# Patient Record
Sex: FEMALE | Race: WHITE | NOT HISPANIC OR LATINO | Employment: FULL TIME | URBAN - METROPOLITAN AREA
[De-identification: names, ages, dates, MRNs, and addresses within clinical notes are randomized per-mention and may not be internally consistent; named-entity substitution may affect disease eponyms.]

---

## 2017-01-10 ENCOUNTER — CONVERSION ENCOUNTER (OUTPATIENT)
Dept: FAMILY MEDICINE CLINIC | Facility: CLINIC | Age: 52
End: 2017-01-10

## 2017-01-11 ENCOUNTER — CONVERSION ENCOUNTER (OUTPATIENT)
Dept: FAMILY MEDICINE CLINIC | Facility: CLINIC | Age: 52
End: 2017-01-11

## 2017-01-11 LAB
ALBUMIN SERPL-MCNC: 4.9 G/DL (ref 3.6–5.1)
ALBUMIN/GLOB SERPL: ABNORMAL {RATIO} (ref 1–2.5)
ALP SERPL-CCNC: 75 UNITS/L (ref 33–130)
ALT SERPL-CCNC: 28 UNITS/L (ref 6–29)
ANA SER QL IA: NEGATIVE
AST SERPL-CCNC: 24 UNITS/L (ref 10–35)
BILIRUB SERPL-MCNC: 0.8 MG/DL (ref 0.2–1.2)
BUN SERPL-MCNC: 10 MG/DL (ref 7–25)
BUN/CREAT SERPL: ABNORMAL (ref 6–22)
CALCIUM SERPL-MCNC: 10.2 MG/DL (ref 8.6–10.4)
CHLORIDE SERPL-SCNC: 103 MMOL/L (ref 98–110)
CHOLEST SERPL-MCNC: 241 MG/DL (ref 125–200)
CHOLEST/HDLC SERPL: ABNORMAL {RATIO}
CO2 CONTENT VENOUS: 29 MMOL/L (ref 20–31)
CONV RF TITER: 7
CONV TOTAL PROTEIN: 7.6 G/DL (ref 6.1–8.1)
CREAT UR-MCNC: 0.73 MG/DL (ref 0.5–1.05)
ERYTHROCYTE [SEDIMENTATION RATE] IN BLOOD BY WESTERGREN METHOD: 11 MM/HR
GLOBULIN UR ELPH-MCNC: ABNORMAL G/DL (ref 1.9–3.7)
GLUCOSE SERPL-MCNC: 85 MG/DL (ref 65–99)
HDLC SERPL-MCNC: 79 MG/DL
LDLC SERPL CALC-MCNC: ABNORMAL MG/DL
POTASSIUM SERPL-SCNC: 4.2 MMOL/L (ref 3.5–5.3)
SODIUM SERPL-SCNC: 141 MMOL/L (ref 135–146)
TRIGL SERPL-MCNC: 65 MG/DL

## 2017-03-29 ENCOUNTER — HOSPITAL ENCOUNTER (OUTPATIENT)
Dept: MAMMOGRAPHY | Facility: HOSPITAL | Age: 52
Discharge: HOME | End: 2017-03-29
Attending: INTERNAL MEDICINE | Admitting: INTERNAL MEDICINE

## 2017-06-30 ENCOUNTER — CONVERSION ENCOUNTER (OUTPATIENT)
Dept: FAMILY MEDICINE CLINIC | Facility: CLINIC | Age: 52
End: 2017-06-30

## 2018-05-16 ENCOUNTER — HOSPITAL ENCOUNTER (OUTPATIENT)
Dept: MAMMOGRAPHY | Facility: HOSPITAL | Age: 53
Discharge: HOME | End: 2018-05-16
Attending: INTERNAL MEDICINE | Admitting: INTERNAL MEDICINE

## 2018-05-18 ENCOUNTER — CONVERSION ENCOUNTER (OUTPATIENT)
Dept: FAMILY MEDICINE CLINIC | Facility: CLINIC | Age: 53
End: 2018-05-18

## 2018-05-18 ENCOUNTER — HOSPITAL ENCOUNTER (OUTPATIENT)
Dept: FAMILY MEDICINE CLINIC | Facility: CLINIC | Age: 53
Setting detail: SPECIMEN
Discharge: HOME | End: 2018-05-18
Attending: INTERNAL MEDICINE | Admitting: INTERNAL MEDICINE

## 2018-05-18 LAB
ALBUMIN SERPL-MCNC: 4.5 G/DL (ref 3.5–4.8)
ALBUMIN/GLOB SERPL: 1.6 {RATIO} (ref 1–1.7)
ALP SERPL-CCNC: 84 IU/L (ref 32–91)
ALT SERPL-CCNC: 33 IU/L (ref 14–54)
ANION GAP SERPL CALC-SCNC: 10.8 MMOL/L (ref 10–20)
AST SERPL-CCNC: 31 IU/L (ref 15–41)
BASOPHILS # BLD AUTO: 0.1 10*3/UL (ref 0–0.2)
BASOPHILS NFR BLD AUTO: 1 % (ref 0–2)
BILIRUB SERPL-MCNC: 1 MG/DL (ref 0.3–1.2)
BUN SERPL-MCNC: 12 MG/DL (ref 8–20)
BUN/CREAT SERPL: 17.1 (ref 5.4–26.2)
CALCIUM SERPL-MCNC: 10.3 MG/DL (ref 8.9–10.3)
CHLORIDE SERPL-SCNC: 106 MMOL/L (ref 101–111)
CHOLEST SERPL-MCNC: 252 MG/DL
CHOLEST/HDLC SERPL: 3.5 {RATIO}
CONV CO2: 28 MMOL/L (ref 22–32)
CONV LDL CHOLESTEROL DIRECT: 161 MG/DL (ref 0–100)
CONV TOTAL PROTEIN: 7.3 G/DL (ref 6.1–7.9)
CREAT UR-MCNC: 0.7 MG/DL (ref 0.4–1)
DIFFERENTIAL METHOD BLD: (no result)
EOSINOPHIL # BLD AUTO: 0.2 10*3/UL (ref 0–0.3)
EOSINOPHIL # BLD AUTO: 3 % (ref 0–3)
ERYTHROCYTE [DISTWIDTH] IN BLOOD BY AUTOMATED COUNT: 13.1 % (ref 11.5–14.5)
GLOBULIN UR ELPH-MCNC: 2.8 G/DL (ref 2.5–3.8)
GLUCOSE SERPL-MCNC: 86 MG/DL (ref 65–99)
HCT VFR BLD AUTO: 43.5 % (ref 35–49)
HDLC SERPL-MCNC: 73 MG/DL
HGB BLD-MCNC: 14.5 G/DL (ref 12–15)
LDLC/HDLC SERPL: 2.2 {RATIO}
LIPID INTERPRETATION: ABNORMAL
LYMPHOCYTES # BLD AUTO: 2.2 10*3/UL (ref 0.8–4.8)
LYMPHOCYTES NFR BLD AUTO: 37 % (ref 18–42)
MCH RBC QN AUTO: 29.5 PG (ref 26–32)
MCHC RBC AUTO-ENTMCNC: 33.3 G/DL (ref 32–36)
MCV RBC AUTO: 88.6 FL (ref 80–94)
MONOCYTES # BLD AUTO: 0.5 10*3/UL (ref 0.1–1.3)
MONOCYTES NFR BLD AUTO: 9 % (ref 2–11)
NEUTROPHILS # BLD AUTO: 2.9 10*3/UL (ref 2.3–8.6)
NEUTROPHILS NFR BLD AUTO: 50 % (ref 50–75)
NRBC BLD AUTO-RTO: 0 /100{WBCS}
NRBC/RBC NFR BLD MANUAL: 0 10*3/UL
PLATELET # BLD AUTO: 302 10*3/UL (ref 150–450)
PMV BLD AUTO: 9.5 FL (ref 7.4–10.4)
POTASSIUM SERPL-SCNC: 3.8 MMOL/L (ref 3.6–5.1)
RBC # BLD AUTO: 4.9 10*6/UL (ref 4–5.4)
SODIUM SERPL-SCNC: 141 MMOL/L (ref 136–144)
TRIGL SERPL-MCNC: 91 MG/DL
VLDLC SERPL CALC-MCNC: 17.8 MG/DL
WBC # BLD AUTO: 5.8 10*3/UL (ref 4.5–11.5)

## 2018-05-30 ENCOUNTER — HOSPITAL ENCOUNTER (OUTPATIENT)
Dept: ULTRASOUND IMAGING | Facility: HOSPITAL | Age: 53
Discharge: HOME | End: 2018-05-30
Attending: INTERNAL MEDICINE | Admitting: INTERNAL MEDICINE

## 2019-05-30 ENCOUNTER — HOSPITAL ENCOUNTER (OUTPATIENT)
Dept: GENERAL RADIOLOGY | Facility: HOSPITAL | Age: 54
Discharge: HOME | End: 2019-05-30
Attending: INTERNAL MEDICINE | Admitting: INTERNAL MEDICINE

## 2019-06-04 VITALS
OXYGEN SATURATION: 98 % | RESPIRATION RATE: 16 BRPM | DIASTOLIC BLOOD PRESSURE: 80 MMHG | DIASTOLIC BLOOD PRESSURE: 85 MMHG | SYSTOLIC BLOOD PRESSURE: 106 MMHG | BODY MASS INDEX: 30.96 KG/M2 | WEIGHT: 209 LBS | HEART RATE: 76 BPM | OXYGEN SATURATION: 97 % | WEIGHT: 204 LBS | WEIGHT: 195 LBS | SYSTOLIC BLOOD PRESSURE: 124 MMHG | DIASTOLIC BLOOD PRESSURE: 72 MMHG | RESPIRATION RATE: 16 BRPM | SYSTOLIC BLOOD PRESSURE: 116 MMHG | HEIGHT: 69 IN | OXYGEN SATURATION: 98 % | HEART RATE: 91 BPM | HEART RATE: 80 BPM | RESPIRATION RATE: 12 BRPM

## 2020-01-17 ENCOUNTER — OFFICE VISIT (OUTPATIENT)
Dept: FAMILY MEDICINE CLINIC | Facility: CLINIC | Age: 55
End: 2020-01-17

## 2020-01-17 VITALS
BODY MASS INDEX: 31.6 KG/M2 | DIASTOLIC BLOOD PRESSURE: 70 MMHG | HEART RATE: 64 BPM | TEMPERATURE: 98.4 F | RESPIRATION RATE: 8 BRPM | SYSTOLIC BLOOD PRESSURE: 110 MMHG | WEIGHT: 214 LBS

## 2020-01-17 DIAGNOSIS — B07.9 VERRUCA VULGARIS: Primary | ICD-10-CM

## 2020-01-17 PROCEDURE — 17000 DESTRUCT PREMALG LESION: CPT | Performed by: INTERNAL MEDICINE

## 2020-01-17 PROCEDURE — 99213 OFFICE O/P EST LOW 20 MIN: CPT | Performed by: INTERNAL MEDICINE

## 2020-01-17 RX ORDER — CETIRIZINE HYDROCHLORIDE 10 MG/1
10 TABLET ORAL DAILY
COMMUNITY

## 2020-01-17 RX ORDER — CALCIUM CARBONATE 200(500)MG
1 TABLET,CHEWABLE ORAL
COMMUNITY
End: 2020-11-19

## 2020-01-17 RX ORDER — NAPROXEN SODIUM 220 MG
440 TABLET ORAL DAILY
COMMUNITY
Start: 2017-01-10

## 2020-01-17 NOTE — PROGRESS NOTES
Rooming Tab(CC,VS,Pt Hx,Fall Screen)  Chief Complaint   Patient presents with   • lesions on face       Subjective   Pt here for lesion on face that is growing- gets red and bleeding at times. Was flat and now raised and flaky.   has other new lesions that concern her as well  I have reviewed and updated her medications, medical history and problem list during today's office visit.     Patient Care Team:  Carly Worley MD as PCP - General  Carly Worley MD as PCP - Family Medicine    Problem List Tab  Medications Tab  Synopsis Tab  Chart Review Tab  Care Everywhere Tab  Immunizations Tab  Patient History Tab    Social History     Tobacco Use   • Smoking status: Never Smoker   • Smokeless tobacco: Never Used   Substance Use Topics   • Alcohol use: Yes     Frequency: Monthly or less     Comment: NO       Review of Systems   Constitutional: Negative for fatigue.   Cardiovascular: Negative for chest pain.   Skin: Positive for skin lesions.       Objective     Rooming Tab(CC,VS,Pt Hx,Fall Screen)  /70 (BP Location: Left arm, Patient Position: Sitting, Cuff Size: Adult)   Pulse 64   Temp 98.4 °F (36.9 °C) (Oral)   Resp 8   Wt 97.1 kg (214 lb)   BMI 31.60 kg/m²     Body mass index is 31.6 kg/m².    Physical Exam   Constitutional: She is oriented to person, place, and time. She appears well-developed and well-nourished.   HENT:   Head: Normocephalic and atraumatic.   Right Ear: Tympanic membrane normal.   Left Ear: Tympanic membrane normal.   Eyes: Pupils are equal, round, and reactive to light.   Neck: Normal range of motion. Neck supple.   Cardiovascular: Normal rate and regular rhythm.   No murmur heard.  Pulmonary/Chest: Effort normal and breath sounds normal.   Abdominal: Soft. Bowel sounds are normal. She exhibits no distension.   Neurological: She is oriented to person, place, and time.   Skin: Capillary refill takes less than 2 seconds.   Right cheek with raised verruca-  irritated    Nursing note and vitals reviewed.       Statin Choice Calculator  Data Reviewed:      Destruction of Lesion  Date/Time: 1/17/2020 8:29 AM  Performed by: Carly Worley MD  Authorized by: Carly Worley MD   Preparation: Patient was prepped and draped in the usual sterile fashion.  Local anesthesia used: no    Anesthesia:  Local anesthesia used: no    Sedation:  Patient sedated: no    Patient tolerance: Patient tolerated the procedure well with no immediate complications  Comments: Right cheek next to nares- verruca freeze for 2 minutes                    Assessment/Plan   Order Review Tab  Health Maintenance Tab  Patient Plan/Order Tab  Diagnoses and all orders for this visit:    1. Verruca vulgaris (Primary)  Comments:  tolerated freeze well. will call if does not resolve  Orders:  -     Destruction of Lesion        Wrapup Tab  Return if symptoms worsen or fail to improve, for Annual physical.      updated health maintenance today  They were informed of the diagnosis and treatment plan and directed to f/u for any further problems or concerns.

## 2020-06-08 ENCOUNTER — PATIENT MESSAGE (OUTPATIENT)
Dept: FAMILY MEDICINE CLINIC | Facility: CLINIC | Age: 55
End: 2020-06-08

## 2020-06-08 RX ORDER — METRONIDAZOLE 10 MG/G
GEL TOPICAL DAILY
Qty: 180 G | Refills: 0 | Status: SHIPPED | OUTPATIENT
Start: 2020-06-08

## 2020-06-08 NOTE — TELEPHONE ENCOUNTER
From: Kaylee Little  To: Carly Worley MD  Sent: 6/8/2020 7:23 AM EDT  Subject: Prescription Question    Dr. Worley prescribed Metrogel quite some time ago for my Rosacea. I am out of the metrogel but can't find the information to request a refill. Can she prescribe it again? And if so, can it be done through [facility] mail order?

## 2020-09-30 ENCOUNTER — PATIENT MESSAGE (OUTPATIENT)
Dept: FAMILY MEDICINE CLINIC | Facility: CLINIC | Age: 55
End: 2020-09-30

## 2020-09-30 DIAGNOSIS — Z12.31 ENCOUNTER FOR SCREENING MAMMOGRAM FOR MALIGNANT NEOPLASM OF BREAST: Primary | ICD-10-CM

## 2020-10-01 NOTE — TELEPHONE ENCOUNTER
From: Kaylee Little  To: Carly Worley MD  Sent: 9/30/2020 10:24 PM EDT  Subject: Referral Request    I have received notification that my mammogram is overdue. Could you please send the order to [facility] so that I may schedule an appointment? Thank you.

## 2020-10-22 ENCOUNTER — APPOINTMENT (OUTPATIENT)
Dept: GENERAL RADIOLOGY | Facility: HOSPITAL | Age: 55
End: 2020-10-22

## 2020-10-22 ENCOUNTER — HOSPITAL ENCOUNTER (OUTPATIENT)
Dept: MAMMOGRAPHY | Facility: HOSPITAL | Age: 55
Discharge: HOME | End: 2020-10-22
Admitting: INTERNAL MEDICINE

## 2020-10-22 ENCOUNTER — HOSPITAL ENCOUNTER (EMERGENCY)
Facility: HOSPITAL | Age: 55
Discharge: HOME | End: 2020-10-22
Admitting: EMERGENCY MEDICINE

## 2020-10-22 VITALS
DIASTOLIC BLOOD PRESSURE: 86 MMHG | SYSTOLIC BLOOD PRESSURE: 122 MMHG | TEMPERATURE: 98.9 F | BODY MASS INDEX: 34.1 KG/M2 | HEART RATE: 75 BPM | RESPIRATION RATE: 17 BRPM | WEIGHT: 225 LBS | HEIGHT: 68 IN | OXYGEN SATURATION: 99 %

## 2020-10-22 DIAGNOSIS — S86.912A KNEE STRAIN, LEFT, INITIAL ENCOUNTER: ICD-10-CM
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
## 2020-10-22 DIAGNOSIS — S92.351A DISPLACED FRACTURE OF FIFTH METATARSAL BONE, RIGHT FOOT, INITIAL ENCOUNTER FOR CLOSED FRACTURE: Primary | ICD-10-CM

## 2020-10-22 DIAGNOSIS — Z12.31 ENCOUNTER FOR SCREENING MAMMOGRAM FOR MALIGNANT NEOPLASM OF BREAST: ICD-10-CM

## 2020-10-22 PROCEDURE — 77063 BREAST TOMOSYNTHESIS BI: CPT

## 2020-10-22 PROCEDURE — 73562 X-RAY EXAM OF KNEE 3: CPT

## 2020-10-22 PROCEDURE — 73630 X-RAY EXAM OF FOOT: CPT

## 2020-10-22 PROCEDURE — 73610 X-RAY EXAM OF ANKLE: CPT

## 2020-10-22 PROCEDURE — 77067 SCR MAMMO BI INCL CAD: CPT

## 2020-10-22 PROCEDURE — 99284 EMERGENCY DEPT VISIT MOD MDM: CPT

## 2020-10-22 RX ORDER — HYDROCODONE BITARTRATE AND ACETAMINOPHEN 5; 325 MG/1; MG/1
1 TABLET ORAL EVERY 6 HOURS PRN
Qty: 12 TABLET | Refills: 0 | Status: SHIPPED | OUTPATIENT
Start: 2020-10-22 | End: 2020-11-19

## 2020-10-22 RX ORDER — ACETAMINOPHEN 500 MG
1000 TABLET ORAL ONCE
Status: COMPLETED | OUTPATIENT
Start: 2020-10-22 | End: 2020-10-22

## 2020-10-22 RX ORDER — HYDROCODONE BITARTRATE AND ACETAMINOPHEN 7.5; 325 MG/1; MG/1
1 TABLET ORAL ONCE
Status: COMPLETED | OUTPATIENT
Start: 2020-10-22 | End: 2020-10-22

## 2020-10-22 RX ADMIN — HYDROCODONE BITARTRATE AND ACETAMINOPHEN 1 TABLET: 7.5; 325 TABLET ORAL at 21:45

## 2020-10-22 RX ADMIN — ACETAMINOPHEN 1000 MG: 500 TABLET, FILM COATED ORAL at 19:44

## 2020-10-22 NOTE — ED NOTES
"Pt had a fall today. Pain and swelling to the left knee and right foot. Pt states she tripped on a step and felt her right foot \"pop\"      Netta Kendall RN  10/22/20 1934    "

## 2020-10-22 NOTE — ED PROVIDER NOTES
Subjective   55-year-old  female arrives to the emergency room via EMS [place] about 45 minutes ago.  Patient states that she was attempting to help her great nephew up onto a step when she misstepped and rolled her right ankle.  She states she fell to the ground and landed on her left knee.  Patient states she did not hit her head or lose consciousness and was able to stand up after the fall.  Onset: Prior to arrival  Location: Right ankle and foot and left knee  Duration: 45 minutes  Character: Aching with standing and movement  Aggravating/Alleviating Factors: Standing and moving/rest and immobilization  Radiation: Radiating up into the right ankle  Severity: Moderate            Review of Systems   Constitutional: Positive for activity change.   Musculoskeletal: Positive for arthralgias, gait problem and joint swelling.   All other systems reviewed and are negative.      Past Medical History:   Diagnosis Date   • AK (actinic keratosis)    • Ankle sprain    • Arthralgia    • Conjunctivitis    • Contact dermatitis, allergic    • Corneal abrasion    • Dry mouth    • Fatigue    • Rosacea    • Seborrheic keratosis        No Known Allergies    No past surgical history on file.    Family History   Problem Relation Age of Onset   • Cancer Father    • Lymphoma Sister         NON HODGKINS   • Rheum arthritis Mother    • Lung cancer Other         MEN ON DAD'S SIDE   • Other Other         BRAIN TUMOR;MEN ON DAD'S SIDE       Social History     Socioeconomic History   • Marital status:      Spouse name: Not on file   • Number of children: Not on file   • Years of education: Not on file   • Highest education level: Not on file   Tobacco Use   • Smoking status: Never Smoker   • Smokeless tobacco: Never Used   Substance and Sexual Activity   • Alcohol use: Yes     Frequency: Monthly or less     Comment: NO   • Drug use: Not Currently     Comment: NO           Objective   Physical  Exam  Constitutional:       General: She is not in acute distress.     Appearance: Normal appearance. She is not ill-appearing, toxic-appearing or diaphoretic.   Musculoskeletal:         General: Swelling and tenderness present.      Left knee: She exhibits bony tenderness. She exhibits normal range of motion, no swelling, no effusion, no ecchymosis, no deformity, no laceration, no erythema and normal alignment. Tenderness found.      Right ankle: She exhibits decreased range of motion and swelling. She exhibits no ecchymosis, no deformity, no laceration and normal pulse. Tenderness.        Feet:    Skin:     General: Skin is warm and dry.      Capillary Refill: Capillary refill takes 2 to 3 seconds.   Neurological:      General: No focal deficit present.      Mental Status: She is alert and oriented to person, place, and time.      Sensory: No sensory deficit.      Coordination: Coordination normal.   Psychiatric:         Mood and Affect: Mood normal.         Behavior: Behavior normal.         Procedures           ED Course        Xr Knee 3 View Left    Result Date: 10/22/2020  Extensive prepatellar edema and small joint effusion. No acute bony abnormality.  Electronically Signed By-Amado Nina DO. On:10/22/2020 8:44 PM This report was finalized on [id number] by  Amado Nina DO..    Xr Ankle 3+ View Right    Result Date: 10/22/2020  Lateral soft tissue swelling and nondisplaced fracture at the base of the fifth metatarsal.  Electronically Signed By-Amado Nina DO. On:10/22/2020 8:01 PM This report was finalized on [id number] by  Amado Nina DO..    Xr Foot 3+ View Right    Result Date: 10/22/2020  Fracture the base of the fifth metatarsal without articular extension.  Electronically Signed By-Amado Nina DO. On:10/22/2020 8:43 PM This report was finalized on [id number] by  Amado Nina DO..      Medications   acetaminophen (TYLENOL) tablet 1,000 mg (1,000 mg Oral Given 10/22/20 1944)      Labs Reviewed - No data to display    Instructions to rest ice and elevate and call orthopedic doctor of choice, tomorrow.  Ace wrap to left knee and knee immobilizer placed prior to discharge.  Ace wrap and cam walker to right foot applied prior to discharge.  7.5 mg of Norco x1 dose, p.o. given prior to discharge.  Struck to patient not to drive while taking narcotic pain medicine.                                         MDM    Final diagnoses:   Displaced fracture of fifth metatarsal bone, right foot, initial encounter for closed fracture   Knee strain, left, initial encounter   Fall (on) (from) other stairs and steps, initial encounter            Yu Riggs, APRN  10/22/20 [id number]

## 2020-10-23 ENCOUNTER — TELEPHONE (OUTPATIENT)
Dept: FAMILY MEDICINE CLINIC | Facility: CLINIC | Age: 55
End: 2020-10-23

## 2020-10-23 DIAGNOSIS — S92.909D CLOSED FRACTURE OF FOOT WITH ROUTINE HEALING, UNSPECIFIED LATERALITY, SUBSEQUENT ENCOUNTER: Primary | ICD-10-CM

## 2020-10-23 NOTE — DISCHARGE INSTRUCTIONS
Rest ice and elevate for the next 5 to 7 days.  Call and schedule appointment with orthopedic doctor of your choice or Dr. Solis.  Very limited weightbearing on the right foot.  Take ibuprofen and Tylenol as directed.  You have been prescribed narcotic pain medicine for your foot fracture may take as directed.

## 2020-10-23 NOTE — TELEPHONE ENCOUNTER
Patient is being referred to Dr. Costa Jesus and the office will contact patient to schedule appt,

## 2020-10-23 NOTE — TELEPHONE ENCOUNTER
Patient called and she fell and broke her right foot. Was advised to contact PCP for a referral for an orthopedic. Best call back [phone].  Malvin(orthopedics)  Pt was advised that the ER visit has to be reported to Venessa from her PCP office.   25-May-2018 00:33 25-May-2018 00:49

## 2020-10-26 ENCOUNTER — TELEPHONE (OUTPATIENT)
Dept: FAMILY MEDICINE CLINIC | Facility: CLINIC | Age: 55
End: 2020-10-26

## 2020-10-26 NOTE — TELEPHONE ENCOUNTER
Patient states she has an Ortho referral on 10/27/20.  She is calling to ask if Dr. Worley will add the left knee to the referral. She states she has an immobilizer on the left knee and does not know when she will be able to remove it.    Please advise.    Patient call back [phone]

## 2020-11-19 ENCOUNTER — LAB (OUTPATIENT)
Dept: FAMILY MEDICINE CLINIC | Facility: CLINIC | Age: 55
End: 2020-11-19

## 2020-11-19 ENCOUNTER — OFFICE VISIT (OUTPATIENT)
Dept: FAMILY MEDICINE CLINIC | Facility: CLINIC | Age: 55
End: 2020-11-19

## 2020-11-19 VITALS
BODY MASS INDEX: 33.49 KG/M2 | HEART RATE: 97 BPM | RESPIRATION RATE: 16 BRPM | WEIGHT: 221 LBS | TEMPERATURE: 97.3 F | HEIGHT: 68 IN | OXYGEN SATURATION: 99 % | DIASTOLIC BLOOD PRESSURE: 70 MMHG | SYSTOLIC BLOOD PRESSURE: 122 MMHG

## 2020-11-19 DIAGNOSIS — S92.909D CLOSED FRACTURE OF FOOT WITH ROUTINE HEALING, UNSPECIFIED LATERALITY, SUBSEQUENT ENCOUNTER: Primary | ICD-10-CM

## 2020-11-19 DIAGNOSIS — L82.1 SK (SEBORRHEIC KERATOSIS): ICD-10-CM

## 2020-11-19 LAB
25(OH)D3 SERPL-MCNC: 31.2 NG/ML (ref 30–100)
ALBUMIN SERPL-MCNC: 4.8 G/DL (ref 3.5–5.2)
ALBUMIN/GLOB SERPL: 1.7 G/DL
ALP SERPL-CCNC: 103 U/L (ref 39–117)
ALT SERPL W P-5'-P-CCNC: 37 U/L (ref 1–33)
ANION GAP SERPL CALCULATED.3IONS-SCNC: 11.3 MMOL/L (ref 5–15)
AST SERPL-CCNC: 35 U/L (ref 1–32)
BILIRUB SERPL-MCNC: 0.5 MG/DL (ref 0–1.2)
BUN SERPL-MCNC: 10 MG/DL (ref 6–20)
BUN/CREAT SERPL: 16.4 (ref 7–25)
CALCIUM SPEC-SCNC: 10.3 MG/DL (ref 8.6–10.5)
CHLORIDE SERPL-SCNC: 103 MMOL/L (ref 98–107)
CO2 SERPL-SCNC: 27.7 MMOL/L (ref 22–29)
CREAT SERPL-MCNC: 0.61 MG/DL (ref 0.57–1)
GFR SERPL CREATININE-BSD FRML MDRD: 101 ML/MIN/1.73
GLOBULIN UR ELPH-MCNC: 2.9 GM/DL
GLUCOSE SERPL-MCNC: 89 MG/DL (ref 65–99)
POTASSIUM SERPL-SCNC: 4.5 MMOL/L (ref 3.5–5.2)
PROT SERPL-MCNC: 7.7 G/DL (ref 6–8.5)
SODIUM SERPL-SCNC: 142 MMOL/L (ref 136–145)

## 2020-11-19 PROCEDURE — 80053 COMPREHEN METABOLIC PANEL: CPT | Performed by: INTERNAL MEDICINE

## 2020-11-19 PROCEDURE — 82306 VITAMIN D 25 HYDROXY: CPT | Performed by: INTERNAL MEDICINE

## 2020-11-19 PROCEDURE — 99214 OFFICE O/P EST MOD 30 MIN: CPT | Performed by: INTERNAL MEDICINE

## 2020-11-19 NOTE — PROGRESS NOTES
"Rooming Tab(CC,VS,Pt Hx,Fall Screen)  Chief Complaint   Patient presents with   • Nevus       Subjective    pt here for nevi and talk about osteporosisi- dexascan  And recently fracture  I have reviewed and updated her medications, medical history and problem list during today's office visit.     Patient Care Team:  Carly Worley MD as PCP - General  Carly Worley MD as PCP - Family Medicine    Problem List Tab  Medications Tab  Synopsis Tab  Chart Review Tab  Care Everywhere Tab  Immunizations Tab  Patient History Tab    Social History     Tobacco Use   • Smoking status: Never Smoker   • Smokeless tobacco: Never Used   Substance Use Topics   • Alcohol use: Yes     Frequency: Monthly or less     Comment: occ       Review of Systems   Constitutional: Negative for fatigue.   Cardiovascular: Negative for chest pain.   Musculoskeletal: Positive for gait problem.   Skin: Positive for skin lesions. Negative for rash.       Objective     Rooming Tab(CC,VS,Pt Hx,Fall Screen)  /70 (BP Location: Left arm, Patient Position: Sitting, Cuff Size: Adult)   Pulse 97   Temp 97.3 °F (36.3 °C) (Skin)   Resp 16   Ht 172.7 cm (68\")   Wt 100 kg (221 lb)   SpO2 99%   BMI 33.60 kg/m²     Body mass index is 33.6 kg/m².    Physical Exam  Vitals signs and nursing note reviewed.   Constitutional:       Appearance: She is well-developed.   HENT:      Head: Normocephalic and atraumatic.      Right Ear: Tympanic membrane normal.      Left Ear: Tympanic membrane normal.   Eyes:      Pupils: Pupils are equal, round, and reactive to light.   Neck:      Musculoskeletal: Normal range of motion and neck supple.   Cardiovascular:      Rate and Rhythm: Normal rate and regular rhythm.      Heart sounds: No murmur.   Pulmonary:      Effort: Pulmonary effort is normal.      Breath sounds: Normal breath sounds.   Abdominal:      General: Bowel sounds are normal. There is no distension.      Palpations: Abdomen is soft.   Skin:    "  Capillary Refill: Capillary refill takes less than 2 seconds.      Comments: Raised dry lesion    Neurological:      Mental Status: She is oriented to person, place, and time.          Statin Choice Calculator  Data Reviewed:              Cryotherapy, Skin Lesion    Date/Time: 11/19/2020 9:54 AM  Performed by: Carly Worley MD  Authorized by: Carly Worley MD   Preparation: Patient was prepped and draped in the usual sterile fashion.  Local anesthesia used: no    Anesthesia:  Local anesthesia used: no    Sedation:  Patient sedated: no    Patient tolerance: patient tolerated the procedure well with no immediate complications        Lab Results   Component Value Date    BUN 10 11/19/2020    CREATININE 0.61 11/19/2020    EGFRIFNONA 101 11/19/2020     11/19/2020    K 4.5 11/19/2020     11/19/2020    CALCIUM 10.3 11/19/2020    ALBUMIN 4.80 11/19/2020    BILITOT 0.5 11/19/2020    ALKPHOS 103 11/19/2020    AST 35 (H) 11/19/2020    ALT 37 (H) 11/19/2020    JFAJ07GH 31.2 11/19/2020      Assessment/Plan   Order Review Tab  Health Maintenance Tab  Patient Plan/Order Tab  Diagnoses and all orders for this visit:    1. Closed fracture of foot with routine healing, unspecified laterality, subsequent encounter (Primary)  Comments:  check vD  Orders:  -     Vitamin D 25 Hydroxy  -     Comprehensive Metabolic Panel    2. SK (seborrheic keratosis)  Assessment & Plan:   tolerated well    Orders:  -     Cryotherapy, Skin Lesion      Wrapup Tab  Return if symptoms worsen or fail to improve.       They were informed of the diagnosis and treatment plan and directed to f/u for any further problems or concerns.            Answers for HPI/ROS submitted by the patient on 11/17/2020   What is the primary reason for your visit?: Other  Please describe your symptoms.: Mole check for mole on my back  Have you had these symptoms before?: No  How long have you been having these symptoms?: Greater than 2 weeks

## 2022-02-24 ENCOUNTER — PATIENT MESSAGE (OUTPATIENT)
Dept: FAMILY MEDICINE CLINIC | Facility: CLINIC | Age: 57
End: 2022-02-24

## 2022-02-24 DIAGNOSIS — R92.8 ABNORMAL MAMMOGRAM OF LEFT BREAST: ICD-10-CM

## 2022-02-24 DIAGNOSIS — Z12.31 ENCOUNTER FOR SCREENING MAMMOGRAM FOR MALIGNANT NEOPLASM OF BREAST: Primary | ICD-10-CM

## 2022-02-25 NOTE — TELEPHONE ENCOUNTER
From: Kaylee Little  To: Carly Worley MD  Sent: 2/24/2022 4:39 PM EST  Subject: Mammogram    I believe my mammogram was due back in October. Could you please send an order to [facility]?

## 2022-03-10 ENCOUNTER — OFFICE VISIT (OUTPATIENT)
Dept: FAMILY MEDICINE CLINIC | Facility: CLINIC | Age: 57
End: 2022-03-10

## 2022-03-10 VITALS
BODY MASS INDEX: 31.52 KG/M2 | OXYGEN SATURATION: 97 % | TEMPERATURE: 97.5 F | RESPIRATION RATE: 18 BRPM | WEIGHT: 208 LBS | SYSTOLIC BLOOD PRESSURE: 108 MMHG | DIASTOLIC BLOOD PRESSURE: 82 MMHG | HEART RATE: 92 BPM | HEIGHT: 68 IN

## 2022-03-10 DIAGNOSIS — K13.0 LIP LESION: Primary | ICD-10-CM

## 2022-03-10 PROCEDURE — 99213 OFFICE O/P EST LOW 20 MIN: CPT | Performed by: INTERNAL MEDICINE

## 2022-03-10 NOTE — PROGRESS NOTES
Rooming Tab(CC,VS,Pt Hx,Fall Screen)  Chief Complaint   Patient presents with   • Mass       Subjective     The patient presents today for evaluation of a bump on her lip.    Lip lesion  The patient reports that she has had a bump on her lip for years. She states that it looks like a blood blister and has become bigger and darker. She reports that she has had 2 bumps recently. Her granddaughter hit her in the lip when they were playing and might have busted it. She denies biting it. The patient states she feels it has been swelling and pulsing lately when she gets stressed or gets hot. She feels like it is getting more blood flow and her lip is tight. She describes the feeling as she gets a fever blister.     Preventive health  She reports she has an appointment in 06/2022 for a Pap smear. She has already scheduled for her mammogram.      I have reviewed and updated her medications, medical history and problem list during today's office visit.     Patient Care Team:  Carly Worley MD as PCP - General  Carly Worley MD as PCP - Family Medicine    Problem List Tab  Medications Tab  Synopsis Tab  Chart Review Tab  Care Everywhere Tab  Immunizations Tab  Patient History Tab    Social History     Tobacco Use   • Smoking status: Never Smoker   • Smokeless tobacco: Never Used   Substance Use Topics   • Alcohol use: Yes     Alcohol/week: 2.0 - 3.0 standard drinks     Types: 1 Glasses of wine, 1 - 2 Cans of beer per week     Comment: occ       Review of Systems   A review of systems was performed, and the pertinent positives are noted in the HPI.     Answers for HPI/ROS submitted by the patient on 3/6/2022  Please describe your symptoms.: Two blue spots in my lip  Have you had these symptoms before?: Yes  How long have you been having these symptoms?: Greater than 2 weeks  Please describe any probable cause for these symptoms. : Years ago when kids were small my daughter’s head hit my lip.   Has always been a  "blue spot but now is causing discomfort.  What is the primary reason for your visit?: Other      Objective     Rooming Tab(CC,VS,Pt Hx,Fall Screen)  /82 (BP Location: Right arm, Patient Position: Sitting, Cuff Size: Adult)   Pulse 92   Temp 97.5 °F (36.4 °C) (Temporal)   Resp 18   Ht 172.7 cm (67.99\")   Wt 94.3 kg (208 lb)   SpO2 97%   BMI 31.63 kg/m²     Body mass index is 31.63 kg/m².    Physical Exam  Vitals and nursing note reviewed.   Constitutional:       Appearance: Normal appearance. She is well-developed.   HENT:      Head: Normocephalic and atraumatic.      Right Ear: Tympanic membrane normal.      Left Ear: Tympanic membrane normal.      Nose: No rhinorrhea.      Mouth/Throat:      Lips: Lesions present.      Pharynx: No posterior oropharyngeal erythema.   Eyes:      Pupils: Pupils are equal, round, and reactive to light.   Cardiovascular:      Rate and Rhythm: Normal rate and regular rhythm.      Pulses: Normal pulses.      Heart sounds: Normal heart sounds. No murmur heard.  Pulmonary:      Effort: Pulmonary effort is normal.      Breath sounds: Normal breath sounds.   Abdominal:      General: Bowel sounds are normal. There is no distension.      Palpations: Abdomen is soft.   Musculoskeletal:         General: No tenderness.      Cervical back: Normal range of motion and neck supple.   Skin:     Capillary Refill: Capillary refill takes less than 2 seconds.   Neurological:      Mental Status: She is alert and oriented to person, place, and time.   Psychiatric:         Mood and Affect: Mood normal.         Behavior: Behavior normal.          Statin Choice Calculator  Data Reviewed:         The data below has been reviewed by Carly Worley MD on 03/10/2022.          Assessment/Plan   Order Review Tab  Health Maintenance Tab  Patient Plan/Order Tab  Diagnoses and all orders for this visit:    1. Lip lesion (Primary)  -     Ambulatory Referral to Plastic Surgery        -  The referral to " plastic surgery was placed.     2. Preventive health        -  We discussed cervical cancer screening recommendations.      Wrapup Tab  Return if symptoms worsen or fail to improve.       They were informed of the diagnosis and treatment plan and directed to f/u for any further problems or concerns.          Transcribed from ambient dictation for Calry Worley MD by TATYANA YOUNG.  03/11/22   11:03 EST    Patient verbalized consent to the visit recording.  I have personally performed the services described in this document as transcribed by the above individual, and it is both accurate and complete.  Carly Worley MD  3/13/2022  09:36 EDT

## 2022-03-14 ENCOUNTER — HOSPITAL ENCOUNTER (OUTPATIENT)
Dept: MAMMOGRAPHY | Facility: HOSPITAL | Age: 57
Discharge: HOME | End: 2022-03-14
Admitting: INTERNAL MEDICINE

## 2022-03-14 DIAGNOSIS — Z12.31 ENCOUNTER FOR SCREENING MAMMOGRAM FOR MALIGNANT NEOPLASM OF BREAST: ICD-10-CM

## 2022-03-14 PROCEDURE — 77067 SCR MAMMO BI INCL CAD: CPT

## 2022-03-14 PROCEDURE — 77063 BREAST TOMOSYNTHESIS BI: CPT

## 2022-03-25 ENCOUNTER — TELEPHONE (OUTPATIENT)
Dept: FAMILY MEDICINE CLINIC | Facility: CLINIC | Age: 57
End: 2022-03-25

## 2022-03-25 ENCOUNTER — HOSPITAL ENCOUNTER (OUTPATIENT)
Dept: ULTRASOUND IMAGING | Facility: HOSPITAL | Age: 57
Discharge: HOME | End: 2022-03-25

## 2022-03-25 ENCOUNTER — HOSPITAL ENCOUNTER (OUTPATIENT)
Dept: MAMMOGRAPHY | Facility: HOSPITAL | Age: 57
Discharge: HOME | End: 2022-03-25

## 2022-03-25 DIAGNOSIS — R92.8 ABNORMAL MAMMOGRAM OF LEFT BREAST: ICD-10-CM

## 2022-03-25 PROCEDURE — G0279 TOMOSYNTHESIS, MAMMO: HCPCS

## 2022-03-25 PROCEDURE — 77065 DX MAMMO INCL CAD UNI: CPT

## 2022-04-05 ENCOUNTER — HOSPITAL ENCOUNTER (OUTPATIENT)
Dept: MAMMOGRAPHY | Facility: HOSPITAL | Age: 57
Discharge: HOME | End: 2022-04-05

## 2022-04-05 DIAGNOSIS — R92.1 BREAST CALCIFICATIONS: ICD-10-CM

## 2022-04-05 PROCEDURE — 0 LIDOCAINE 1 % SOLUTION: Performed by: INTERNAL MEDICINE

## 2022-04-05 PROCEDURE — 88305 TISSUE EXAM BY PATHOLOGIST: CPT | Performed by: INTERNAL MEDICINE

## 2022-04-05 PROCEDURE — 88360 TUMOR IMMUNOHISTOCHEM/MANUAL: CPT | Performed by: INTERNAL MEDICINE

## 2022-04-05 PROCEDURE — 76098 X-RAY EXAM SURGICAL SPECIMEN: CPT

## 2022-04-05 PROCEDURE — A4648 IMPLANTABLE TISSUE MARKER: HCPCS

## 2022-04-05 RX ORDER — LIDOCAINE HYDROCHLORIDE 10 MG/ML
3 INJECTION, SOLUTION INFILTRATION; PERINEURAL ONCE
Status: COMPLETED | OUTPATIENT
Start: 2022-04-05 | End: 2022-04-05

## 2022-04-05 RX ORDER — LIDOCAINE HYDROCHLORIDE AND EPINEPHRINE 10; 10 MG/ML; UG/ML
8 INJECTION, SOLUTION INFILTRATION; PERINEURAL ONCE
Status: COMPLETED | OUTPATIENT
Start: 2022-04-05 | End: 2022-04-05

## 2022-04-05 RX ADMIN — LIDOCAINE HYDROCHLORIDE,EPINEPHRINE BITARTRATE 20 ML: 10; .01 INJECTION, SOLUTION INFILTRATION; PERINEURAL at 13:13

## 2022-04-05 RX ADMIN — Medication 3 ML: at 13:13

## 2022-04-08 ENCOUNTER — TELEPHONE (OUTPATIENT)
Dept: FAMILY MEDICINE CLINIC | Facility: CLINIC | Age: 57
End: 2022-04-08

## 2022-04-08 ENCOUNTER — OFFICE VISIT (OUTPATIENT)
Dept: FAMILY MEDICINE CLINIC | Facility: CLINIC | Age: 57
End: 2022-04-08

## 2022-04-08 VITALS
SYSTOLIC BLOOD PRESSURE: 120 MMHG | TEMPERATURE: 97.7 F | RESPIRATION RATE: 18 BRPM | OXYGEN SATURATION: 97 % | DIASTOLIC BLOOD PRESSURE: 88 MMHG | HEART RATE: 89 BPM

## 2022-04-08 DIAGNOSIS — D05.12 DUCTAL CARCINOMA IN SITU (DCIS) OF LEFT BREAST: Primary | ICD-10-CM

## 2022-04-08 PROCEDURE — 99213 OFFICE O/P EST LOW 20 MIN: CPT | Performed by: INTERNAL MEDICINE

## 2022-04-08 RX ORDER — MULTIPLE VITAMINS W/ MINERALS TAB 9MG-400MCG
1 TAB ORAL DAILY
COMMUNITY
Start: 2022-04-03

## 2022-04-08 NOTE — PROGRESS NOTES
Rooming Tab(CC,VS,Pt Hx,Fall Screen)  Chief Complaint   Patient presents with   • Biospy Results       Subjective   Pt here with  for results of breast biopsy. Explained pathology shows  carcinoma in site- meaning stage 0 breast cancer.  All questions discussed and will refer to breast surgeon      I have reviewed and updated her medications, medical history and problem list during today's office visit.     Patient Care Team:  Carly Worley MD as PCP - General  Carly Worley MD as PCP - Family Medicine    Problem List Tab  Medications Tab  Synopsis Tab  Chart Review Tab  Care Everywhere Tab  Immunizations Tab  Patient History Tab    Social History     Tobacco Use   • Smoking status: Never Smoker   • Smokeless tobacco: Never Used   Substance Use Topics   • Alcohol use: Yes     Alcohol/week: 2.0 standard drinks     Types: 1 Glasses of wine, 1 Cans of beer per week     Comment: Occasionally       Review of Systems  Answers for HPI/ROS submitted by the patient on 4/7/2022  Please describe your symptoms.: I need to discuss biopsy results.  Have you had these symptoms before?: No  How long have you been having these symptoms?: 1-4 days  What is the primary reason for your visit?: Other      Objective     Rooming Tab(CC,VS,Pt Hx,Fall Screen)  /88 (BP Location: Left arm, Patient Position: Sitting, Cuff Size: Adult)   Pulse 89   Temp 97.7 °F (36.5 °C) (Temporal)   Resp 18   SpO2 97%     There is no height or weight on file to calculate BMI.    Physical Exam  Constitutional:       Appearance: Normal appearance.   HENT:      Head: Normocephalic and atraumatic.   Cardiovascular:      Rate and Rhythm: Normal rate and regular rhythm.      Pulses: Normal pulses.   Musculoskeletal:      Cervical back: Neck supple.   Neurological:      General: No focal deficit present.      Mental Status: She is alert.          Statin Choice Calculator  Data Reviewed:    MRI Breast Bilateral With & Without  Contrast    Result Date: 4/20/2022  Impression: 1. Biopsy-proven malignancy in the left breast centered at the 12 o'clock position with the barbell-shaped metallic clip within a postbiopsy cavity. Nonspecific enhancement is seen surrounding the cavity. There is no evidence for left axillary adenopathy. 2. There is linear branching enhancement seen in the middle third medial aspect of the left breast at the 9 o'clock position. No mammographic correlate is appreciated. If breast conservation therapy is desired, correlation with an MR guided left breast biopsy should be considered. 3. Linear, borderline clumped enhancement that measures on the order 3.5 cm in greatest dimension in the middle third upper outer quadrant of the left breast at the 2 o'clock position without a mammographic correlate. If breast conservation therapy is desired, correlation with an MR guided left breast biopsy should be considered. 4. There are no findings suspicious for malignancy in the right breast.  BI-RADS category 4: Suspicious. Biopsy should be considered.  This report was finalized on 4/20/2022 4:59 PM by Dr. Khoi Herron M.D.      Mammo Diagnostic Digital Tomosynthesis Left With CAD    Result Date: 3/25/2022  Impression: Suspicious left breast microcalcifications. I would recommend that the patient undergo a stereotactic guided percutaneous vacuum assisted core biopsy procedure.  ASSESSMENT: BIRADS:  BI-RADS 4. Suspicious abnormality.  BI-RADS category Key: Category 0-incomplete-needs additional imaging and/or prior images for comparison. Category 1-negative. Category 2-benign findings. Category 3-probably benign-short interval follow-up suggested. Category 4-suspicious abnormality-biopsy should be considered. Category 5-highly suggestive for malignancy-appropriate action should be taken. Category 6-known biopsy-proven malignancy-appropriate action should be taken.  NOTE: There is at least a 15% false-negative rate and mammographic  detection of cancer. Therefore, management of a palpable abnormality must be based on clinical grounds and a negative mammography report should not defer further breast evaluation when clinically indicated.  The patient's information is been entered into a reminder system (MRS) with a targeted due date for the next mammogram.  Patient's with mammographically dense breast will be notified by mail, according to [place] law. We believe these women should undergo a risk assessment, taking into consideration breast density and other factors, including but not limited to, family history of breast cancer and other malignancies and personal history of breast cancer are high risk lesions. Women who are found to have lifetime risk of greater than 20-25% may benefit  from additional screening, such as with breast MRI.  Electronically Signed By-Harvinder Duong MD On:3/25/2022 3:17 PM This report was finalized on [id number] by  Harvinder Duong MD.    Mammo Stereotactic Breast Biopsy Initial With & Without Device    Addendum Date: 4/7/2022    The patient's final pathology results are now available from the left breast stereotactic guided core biopsy procedure. There are findings consistent with ductal carcinoma in situ which is concordant with the imaging findings. This was intermediate nuclear grade with central necrosis. There was no invasive carcinoma seen within the specimen. Microcalcifications were identified within neoplastic and benign ducts spaces. I would recommend appropriate surgical and oncologic management of this patient. The referring clinician's office has been contacted by the [facility] staff with the pathology results.  Electronically Signed By-Harvinder Duong MD On:4/7/2022 4:51 PM This report was finalized on [id number] by  Harvinder Duong MD.    Result Date: 4/7/2022  Impression: IMPRESSION : Technically successful stereotactic biopsy of the breast, with the biopsy clip in expected  position. After final pathology has been reviewed, an addendum will be dictated for concordance and further recommendations.  Electronically Signed By-Harvinder Duong MD On:4/5/2022 1:45 PM This report was finalized on [id number] by  Harvinder Duong MD.    Mammo Stereotactic Breast Specimen Left    Addendum Date: 4/7/2022    The patient's final pathology results are now available from the left breast stereotactic guided core biopsy procedure. There are findings consistent with ductal carcinoma in situ which is concordant with the imaging findings. This was intermediate nuclear grade with central necrosis. There was no invasive carcinoma seen within the specimen. Microcalcifications were identified within neoplastic and benign ducts spaces. I would recommend appropriate surgical and oncologic management of this patient. The referring clinician's office has been contacted by the [facility] staff with the pathology results.  Electronically Signed By-Harvinder Duong MD On:4/7/2022 4:51 PM This report was finalized on [id number] by  Harvinder Duong MD.    Result Date: 4/7/2022  Impression: IMPRESSION : Technically successful stereotactic biopsy of the breast, with the biopsy clip in expected position. After final pathology has been reviewed, an addendum will be dictated for concordance and further recommendations.  Electronically Signed By-Harvinder Duong MD On:4/5/2022 1:45 PM This report was finalized on [id number] by  Harvinder Duong MD.                Assessment/Plan   Order Review Tab  Health Maintenance Tab  Patient Plan/Order Tab  Diagnoses and all orders for this visit:    1. 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       They were informed of the diagnosis and treatment plan and directed to f/u for any  further problems or concerns.      Pt here with  all questions answered to best of my ability. Will send to Breast surgeons in [place]. She would like any oncology needed in [place]

## 2022-04-08 NOTE — TELEPHONE ENCOUNTER
Becca Rockwell with Dr Haydee Claudio's office called because they received the referral on patient for a DCIS.  Dr Claudio's first available cancer appt is not until the first week of May.  They were not sure you wanted patient to wait that long.  If that is too long of a wait, they recommend Dr. Nena Mendoza with [facility] and her phone is [phone].  Her  Jesika is off on Fridays but there is staff in her office today.  Dr. Claudio's office will hold on to the referral until they hear what you want to do for patient.  Said a phone call or note in Epic is fine.

## 2022-04-11 NOTE — TELEPHONE ENCOUNTER
Patient is being referred to Dr. Nena Mendoza and that office will contact patient to get her scheduled.

## 2022-04-12 DIAGNOSIS — C50.919 MALIGNANT NEOPLASM OF FEMALE BREAST, UNSPECIFIED ESTROGEN RECEPTOR STATUS, UNSPECIFIED LATERALITY, UNSPECIFIED SITE OF BREAST: Primary | ICD-10-CM

## 2022-04-18 ENCOUNTER — OFFICE VISIT (OUTPATIENT)
Dept: SURGERY | Facility: CLINIC | Age: 57
End: 2022-04-18

## 2022-04-18 VITALS — WEIGHT: 211 LBS | HEIGHT: 68 IN | BODY MASS INDEX: 31.98 KG/M2

## 2022-04-18 DIAGNOSIS — D05.12 DUCTAL CARCINOMA IN SITU (DCIS) OF LEFT BREAST: Primary | ICD-10-CM

## 2022-04-18 PROCEDURE — 99204 OFFICE O/P NEW MOD 45 MIN: CPT | Performed by: STUDENT IN AN ORGANIZED HEALTH CARE EDUCATION/TRAINING PROGRAM

## 2022-04-18 NOTE — PROGRESS NOTES
General Surgery Breast Cancer History and Physical Exam     Summary:    Kaylee Little is a 56 y.o. lady. The patient presents with a new diagnosis of left breast DCIS: Grade II,  ER+/MN-; cTisN0.      A multidisciplinary plan has been formulated for the patient:    (1) Breast Surgical Oncology:  -Follow up [employer] 9 panel genetic testing. I will call her with results.   -MRI to evaluate anatomy as well as for surgical planning. Planned for 4/20/2022   -Nurse navigator consult.   -Surgical plan: Likely plan for L breast wire localized lumpectomy pending MRI results.   -Plastic surgery referral deferred.     (2) Medical Oncology:  -Will refer postoperatively for evaluation for endocrine therapy. Would like to see oncology in [place].     (3) Radiation Oncology:  -Will refer postoperatively for evaluation for radiation therapy. Would like to see radiation at [place].     Referring Provider: No ref. provider found    Chief Complaint: abnormal breast imaging    History of Present Illness: Ms. Kaylee Little is a 56 y.o. year old lady, seen at the request of No ref. provider found for a new diagnosis of left breast cancer.      This was initially detected as an imaging abnormality. She has had annual mammograms each year. She denies any prior history of abnormal mammograms or breast biopsies. Her work-up is detailed in the oncologic history below.     She denies any breast lumps, pain, skin changes, or nipple discharge.     Workup of Current Diagnosis:    3/14/2022 Bilateral Screening Mammogram:  IMPRESSION:  Indeterminate left breast calcifications. Recommend left diagnostic  mammogram.  BI-RADS ASSESSMENT: BI-RADS 0. Incomplete    3/25/2022 Left Breast Diagnostic Mammogram:   IMPRESSION:  Suspicious left breast microcalcifications. I would recommend that the patient undergo a stereotactic guided percutaneous vacuum assisted core biopsy procedure.  ASSESSMENT:  BIRADS:  BI-RADS 4. Suspicious  abnormality.    4/5/2022 Left Breast Stereotactic Biopsy:   An 8 gauge vacuum-assisted core needle biopsy device was advanced into the breast. Targeting was confirmed with prefire radiographs. 6 specimens were obtained. Specimen radiograph was then performed confirming the presence of calcifications within the specimens. A barbell-shaped clip  was then placed. The biopsy clip is in expected position in the biopsy bed.   IMPRESSION :   Technically successful stereotactic biopsy of the breast, with the biopsy clip in expected position. After final pathology has been reviewed, an addendum will be dictated for concordance and further recommendations.     4/5/2022 Pathology:   Final Diagnosis   Calcifications, left breast, biopsies:    Ductal carcinoma in situ, clinging type, intermediate nuclear grade with central necrosis    No invasive carcinoma is identified    Microcalcifications identified within neoplastic and benign duct spaces     Past Medical History:   • None    Past Surgical History:    • D&C hysteroscopy     Family History:    • Sister with non-Hodgkins lymphoma     Social History:  • Denies tobacco use  • Occasional alcohol use    Allergies:   No Known Allergies    Medications:     Current Outpatient Medications:   •  Calcium Carbonate-Vitamin D (Calcium 600+D) 600-200 MG-UNIT tablet, Take  by mouth 2 (two) times a day., Disp: , Rfl:   •  cetirizine (zyrTEC) 10 MG tablet, Take 10 mg by mouth Daily., Disp: , Rfl:   •  metroNIDAZOLE (Metrogel) 1 % gel, Apply  topically to the appropriate area as directed Daily., Disp: 180 g, Rfl: 0  •  multivitamin with minerals tablet tablet, , Disp: , Rfl:   •  naproxen sodium (ALEVE) 220 MG tablet, Take 440 mg by mouth Daily., Disp: , Rfl:   •  Omega-3 Fatty Acids (FISH OIL PO), FISH OIL CAPS, Disp: , Rfl:     Laboratory Values:    No recent labs    Review of Systems:   Influenza-like illness: no fever, no  cough, no  sore throat, no  body aches, no loss of sense of taste  or smell, no known exposure to person with Covid-19.  Constitutional: Negative for fevers or chills  HENT: Negative for hearing loss or runny nose  Eyes: Negative for vision changes or scleral icterus  Respiratory: Negative for cough or shortness of breath  Cardiovascular: Negative for chest pain or heart palpitations  Gastrointestinal: Negative for abdominal pain, nausea, vomiting, constipation, melena, or hematochezia  Genitourinary: Negative for hematuria or dysuria  Musculoskeletal: Negative for joint swelling or gait instability  Neurologic: Negative for tremors or seizures  Psychiatric: Negative for suicidal ideations or depression  All other systems reviewed and negative    Physical Exam:   • ECO - Asymptomatic  • Constitutional: Well-developed well-nourished, no acute distress  • Eyes: Conjunctiva normal, sclera nonicteric  • ENMT: Hearing grossly normal, oral mucosa moist  • Neck: Supple, no palpable mass, trachea midline  • Respiratory: Clear to auscultation, normal inspiratory effort  • Cardiovascular: Regular rate, no peripheral edema, no jugular venous distention  • Breast: symmetric  o Right: No visible abnormalities on inspection while seated, with arms raised or hands on hips. No masses, skin changes, or nipple abnormalities.  o Left: No visible abnormalities on inspection while seated, with arms raised or hands on hips. No masses, skin changes, or nipple abnormalities.  o Biopsy site appreciated in left outer mid breast, otherwise no skin changes.   o No clinical chest wall involvement.  • Gastrointestinal: Soft, nontender  • Lymphatics (palpable nodes): no cervical, supraclavicular or axillary lymphadenopathy  • Skin:  Warm, dry, no rash on visualized skin surfaces  • Musculoskeletal: Symmetric strength, normal gait  • Psychiatric: Alert and oriented ×3, normal affect     Discussion:  I had an extensive discussion with the patient and her family about the nature of her breast cancer diagnosis.  We reviewed the components of breast tissue including ducts and lobules. We reviewed her pathology report in detail. We reviewed breast cancer histology, including stage, grade, ER/UT receptors, HER2 receptors and how this applies to her diagnosis. We reviewed the basics of systemic and local/regional management of breast cancer.     We discussed that most breast cancer is not hereditary, that I do not believe this plays a role in her case, and that genetic testing is not warranted.     We reviewed potential surgical treatments to include partial mastectomy, mastectomy, sentinel lymph node biopsy and axillary node dissection and discussed the rationale associated with each approach. Regarding radiation therapy, we discussed that radiation is indicated in all cases of breast conservation and in only limited circumstances following mastectomy. We discussed that the primary goal of adjuvant radiation is to decrease the likelihood of local recurrence.     In her case, she is a good candidate for lumpectomy and she would like to proceed with breast conservation. We discussed that lumpectomy would require preoperative wire-localization. We also discussed the risk of positive margins and that she must have negative margins for lumpectomy to be an appropriate oncologic procedure. I will make every effort to obtain negative margins at her initial operation, but there is a 10-15% chance that she will require a second operation for re-excision, or possibly a total mastectomy. We will not know the margin status until after her final pathology has returned.     I described additional risks and potential complications associated with surgery, including, but not limited to, bleeding, infection, deformity/poor cosmetic result, chronic pain, numbness, seroma, hematoma, deep venous thrombosis, skin flap necrosis, disease recurrence and the possibility of requiring additional surgery. We also discussed other treatment options  including the option of not undergoing any surgical treatment and the risks associated with this including disease progression. She expressed an understanding of these factors and wished to proceed.    We discussed that in her case, systemic treatment would likely involve endocrine therapy/targeted therapy.     PRABHU WEBB M.D.  General and Endoscopic Surgery  [facility]    [address], [address]  [place], [place], [address]  P: [phone]  F: [phone]

## 2022-04-19 ENCOUNTER — NURSE NAVIGATOR (OUTPATIENT)
Dept: OTHER | Facility: HOSPITAL | Age: 57
End: 2022-04-19

## 2022-04-19 ENCOUNTER — LAB REQUISITION (OUTPATIENT)
Dept: LAB | Facility: HOSPITAL | Age: 57
End: 2022-04-19

## 2022-04-19 DIAGNOSIS — Z00.00 ENCOUNTER FOR GENERAL ADULT MEDICAL EXAMINATION WITHOUT ABNORMAL FINDINGS: ICD-10-CM

## 2022-04-19 LAB
LAB AP CASE REPORT: NORMAL
LAB AP DIAGNOSIS COMMENT: NORMAL
LAB AP SPECIAL STAINS: NORMAL
PATH REPORT.FINAL DX SPEC: NORMAL
PATH REPORT.GROSS SPEC: NORMAL

## 2022-04-19 PROCEDURE — 88360 TUMOR IMMUNOHISTOCHEM/MANUAL: CPT | Performed by: STUDENT IN AN ORGANIZED HEALTH CARE EDUCATION/TRAINING PROGRAM

## 2022-04-19 PROCEDURE — 88341 IMHCHEM/IMCYTCHM EA ADD ANTB: CPT | Performed by: STUDENT IN AN ORGANIZED HEALTH CARE EDUCATION/TRAINING PROGRAM

## 2022-04-19 PROCEDURE — 88342 IMHCHEM/IMCYTCHM 1ST ANTB: CPT | Performed by: STUDENT IN AN ORGANIZED HEALTH CARE EDUCATION/TRAINING PROGRAM

## 2022-04-19 NOTE — PROGRESS NOTES
Referral received from Dr. Mendoza's office. I called Ms. Little and introduced myself and navigational services. She stated the plan is to have a lumpectomy but will wait for her MRI results to return before finalizing surgery. Otherwise, she has a good understanding of her pathology and treatment options and is comfortable with her plan of care.     She stated she has a good support system and has no resource needs at this time.    We discussed integrative therapies and other services at the [facility] [place]. She verbalized interest in receiving a navigation folder outlining services. I verified her mailing address and will send out a navigation folder with the following information:     [facility],  Sharing Our Stories Breast Cancer Support Group, Cancer and Restorative Exercise (CARE), [employer] Exercise program, Guide for the Newly Diagnosed, Bio[employer]ped[employer], [facility] Resource [facility], Massage Therapy, Reiki Therapy, [employer] [place], Cancer Nutrition, and Survivorship Clinic.    She verbalized appreciation for navigational services and she has my contact information and will call with any questions that arise.

## 2022-04-20 ENCOUNTER — HOSPITAL ENCOUNTER (OUTPATIENT)
Dept: MRI IMAGING | Facility: HOSPITAL | Age: 57
Discharge: HOME | End: 2022-04-20
Admitting: STUDENT IN AN ORGANIZED HEALTH CARE EDUCATION/TRAINING PROGRAM

## 2022-04-20 DIAGNOSIS — C50.919 MALIGNANT NEOPLASM OF FEMALE BREAST, UNSPECIFIED ESTROGEN RECEPTOR STATUS, UNSPECIFIED LATERALITY, UNSPECIFIED SITE OF BREAST: ICD-10-CM

## 2022-04-20 PROCEDURE — A9577 INJ MULTIHANCE: HCPCS | Performed by: STUDENT IN AN ORGANIZED HEALTH CARE EDUCATION/TRAINING PROGRAM

## 2022-04-20 PROCEDURE — 0 GADOBENATE DIMEGLUMINE 529 MG/ML SOLUTION: Performed by: STUDENT IN AN ORGANIZED HEALTH CARE EDUCATION/TRAINING PROGRAM

## 2022-04-20 PROCEDURE — 77049 MRI BREAST C-+ W/CAD BI: CPT

## 2022-04-20 RX ADMIN — GADOBENATE DIMEGLUMINE 20 ML: 529 INJECTION, SOLUTION INTRAVENOUS at 15:13

## 2022-04-21 ENCOUNTER — TELEPHONE (OUTPATIENT)
Dept: SURGERY | Facility: CLINIC | Age: 57
End: 2022-04-21

## 2022-04-21 NOTE — TELEPHONE ENCOUNTER
Called Kaylee Little regarding MRI results    IMPRESSION:  1. Biopsy-proven malignancy in the left breast centered at the 12 o'clock position with the barbell-shaped metallic clip within a postbiopsy cavity. Nonspecific enhancement is seen surrounding the cavity. There is no evidence for left axillary adenopathy.  2. There is linear branching enhancement seen in the middle third medial aspect of the left breast at the 9 o'clock position. No mammographic correlate is appreciated. If breast conservation therapy is desired, correlation with an MR guided left breast biopsy should be considered.  3. Linear, borderline clumped enhancement that measures on the order 3.5 cm in greatest dimension in the middle third upper outer quadrant of the left breast at the 2 o'clock position without a mammographic correlate. If breast conservation therapy is desired, correlation with an MR guided  left breast biopsy should be considered.  4. There are no findings suspicious for malignancy in the right breast.  BI-RADS category 4: Suspicious. Biopsy should be considered.    She would like to consider L breast MRI guided biopsy x2 versus L mastectomy. She will call back after discussing with her family.    Eloise Mendoza MD

## 2022-04-22 ENCOUNTER — NURSE NAVIGATOR (OUTPATIENT)
Dept: OTHER | Facility: HOSPITAL | Age: 57
End: 2022-04-22

## 2022-04-22 NOTE — PROGRESS NOTES
Ms. Little called with questions about her MRI results, MRI guided biopsy, lumpectomy vs mastectomy, and plastic surgery referral. Spoke with her and her daughter at length regarding her questions. She would like to move forward with a plastic surgery referral and discuss mastectomy options. Message sent to Dr. Mendoza with update. She was thankful for the information and will reach out with any other concerns.

## 2022-04-23 PROBLEM — D05.12 DUCTAL CARCINOMA IN SITU (DCIS) OF LEFT BREAST: Status: ACTIVE | Noted: 2022-04-23

## 2022-04-25 ENCOUNTER — TELEPHONE (OUTPATIENT)
Dept: FAMILY MEDICINE CLINIC | Facility: CLINIC | Age: 57
End: 2022-04-25

## 2022-04-25 DIAGNOSIS — C50.911 MALIGNANT NEOPLASM OF RIGHT FEMALE BREAST, UNSPECIFIED ESTROGEN RECEPTOR STATUS, UNSPECIFIED SITE OF BREAST: Primary | ICD-10-CM

## 2022-04-25 NOTE — TELEPHONE ENCOUNTER
Referral has been submitted to [facility] and is pending Authorization for Marjorie Garcia.

## 2022-04-25 NOTE — TELEPHONE ENCOUNTER
----- Message from Kaylee Little sent at 4/25/2022 11:15 AM EDT -----  Regarding: Plastic surgery referral for mastectomy   Dr. Mendoza’s office just telephoned me to say they can not move forward with my mastectomy surgery scheduling until your office approves my reconstruction plastic surgery referral to Dr. Venessa Garcia.  They said you should be able to see the info in [facility] (app[facility].   They asked me to call the office  but no one was answering the referral line.   Usually a voicemail picks up.  I thought you might be having phone problems.

## 2022-04-27 ENCOUNTER — TELEPHONE (OUTPATIENT)
Dept: SURGERY | Facility: CLINIC | Age: 57
End: 2022-04-27

## 2022-04-27 NOTE — TELEPHONE ENCOUNTER
Referral from [facility] came through for RT breast. Can you change the order for the LT breast instead?

## 2022-05-02 NOTE — PROGRESS NOTES
"Consult (Breast Recon of left breast)            History of Present Illness  Kaylee Little is a 56 y.o. female who presents to [facility] as a consult from Dr. Mendoza to discuss breast reconstructive options.  Planning left breast mastectomy.  She wears 38D bra size and would like to be a little smaller if possible. She is a  at a school. Non smoker.  MRI  IMPRESSION:  1. Biopsy-proven malignancy in the left breast centered at the 12  o'clock position with the barbell-shaped metallic clip within a  postbiopsy cavity. Nonspecific enhancement is seen surrounding the  cavity. There is no evidence for left axillary adenopathy.  2. There is linear branching enhancement seen in the middle third medial  aspect of the left breast at the 9 o'clock position. No mammographic  correlate is appreciated. If breast conservation therapy is desired,  correlation with an MR guided left breast biopsy should be considered.  3. Linear, borderline clumped enhancement that measures on the order 3.5  cm in greatest dimension in the middle third upper outer quadrant of the  left breast at the 2 o'clock position without a mammographic correlate.  If breast conservation therapy is desired, correlation with an MR guided  left breast biopsy should be considered.  4. There are no findings suspicious for malignancy in the right breast.      Subjective      Patient has no known allergies.  Allergies Reconciled.    Review of Systems  All system were reviewed and were negative, except the ones noted above.     @Objective     /81   Pulse 90   Temp 98.2 °F (36.8 °C) (Temporal)   Ht 172.7 cm (67.99\")   Wt 96.1 kg (211 lb 12.8 oz)   SpO2 100%   BMI 32.21 kg/m²     Body mass index is 32.21 kg/m².    Physical Exam  Physical exam:  Patient awake, alert, oriented  Respirations are non elaborated  Patient is not tachycardic    Breast exam:     Bilateral breast with  grade 2 nipple " ptosis   Axillary Lymphadenopathy: No axillary lymphadenopathy  SN-N (Right Breast): 29  SN-N (Left Breast): 29  N-IMF (Right Breast): 12  N-IMF (Left Breast): 12  Base Width (Right Breast): 13  Base Width (Left Breast): 13     Well healed biopsy site on left breast, right breast slightly large with more lateral fullness, no open areas         Result Review :              Assessment and Plan      Diagnoses and all orders for this visit:    1. 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  In addition, we discussed options for symmetry procedures and nipple reconstruction.  The patient understands that her reconstructed breast will not have the same sensation as a natural breast and that visible incision lines will always be present.  In addition, patient understand that breast reconstruction is a multi-stage procedure that can take months to years to complete.   • We will send information for prior authorization.  Photos were obtained.   • Patient was given the breast reconstruction pamphlet as well as directed to the ASPS website for additional information.   • The patient was made aware that the FDA has discovered rare occurrences between an uncommon form of lymphoma (anaplastic large cell) and women with breast implants.  While the incidence is quite low, the risk of development is real; therefore, any unusual symptoms or signs (most commonly a late fluid collection years later) should be brought to the attention of your physician.  Patient also counseled on risks and benefits of saline versus silicone implants, lifting restrictions, scar placement, risk of capsular contracture, animation deformity, need for likely revision of breast implants at some point in her life, FDA recommendation of MRI every three years to monitor for rupture, and  continued mammography for breast cancer surveillance.   •   • We had a long discussion with the patient today regarding her reconstructive options.  These include no reconstruction, reconstruction at the time of mastectomy or lumpectomy, and finally delayed reconstruction.  We discussed specific types of reconstruction, including: tissue expander and/or implants, and autologous reconstruction with either pedicled or free flap.  We also covered the later stages of reconstruction, including nipple reconstruction and areola tattooing, fat grafting, and symmetry procedures if indicated or desired.   • I described the typical jackie-operative course of each procedure, including the nature of the procedure, hospital stay, necessity for drains, post-operative activity restriction, and postoperative visits (including those for tissue expansion).  We also discussed the time frame for reconstruction.  I shared information about saline vs. silicone implants, including their differences, risk of capsular contracture, rippling, or leak/rupture, and safety/monitoring issues.  I described Alloderm and its use for implant reconstruction. We discussed that radiation therapy, if she ultimately requires it, may alter the reconstructive options.     • We reviewed surgical risks including, but not limited to, infection, bleeding, hematoma, seroma, pain, scarring, numbness, skin or nipple loss, wound healing problems, flap failures including partial or complete flap loss, asymmetry, need for revisions or further surgeries,  and risks associated with anesthesia.   • Additional risks with autologous reconstruction include donor wound morbidities, such as hernias or bulges, wound healing problems, muscle weakness, partial or complete flap loss, and typical surgical risks as listed above.   • The use of biological mesh is not for soft tissue reinforcement. The use of mesh is necessary because the mastectomy dissection pocket is larger than the  implant itself. The intention of the mesh is to restrain the displacement of the implant to the axilla, which is a very common complication requiring revision. In my experience, the use of mesh avoids that specific complication and very often avoids a second surgery. The use of mesh also allows me to perform a safer procedure since it holds the implant in place and alleviates the pressure over the skin that depends only on the subdermal blood supply. Without the mesh, I believe I wouldn't be able to perform direct implant reconstruction and give the optimal result that patient desires and deserves.   •  Specifically on her case,  She is opting for left mastectomy and would like to have left breast immediate reconstruction if possible and then right breat reduction for symmetry. She understands we will attempt implants but may have to use an expander first.   • Consent:  Left breast immediate reconstruction with implant and mesh, possible expander, right breast  reduction and use of spy   • Target implant size: 500-600 cc  •   •   • CPT codes:   • 27647-02 - immediate insertion of breast prosthesis following reconstruction   • 32571-27  implantation of biologic implant  • 40387- intravenous injection of agent (SPY)  • 33210- right breast reduction  ·   · Possible:    • 99241 tissue expander placement  •   •     Implants Allergan   · Allergan , 560, 605, 650, 695, 745x 2  · , 560, 605, 650,695 x2  · Expander: [id number] x2  · Single use sizer [id number] x1    Mesh:  • Mesh: alloderm select contour perforated [id number] x2 (For implants larger than 500 cc)  •   •   OR time: 3 hours     Follow Up     No follow-ups on file.    Patient was given instructions and counseling regarding her condition. Please see specific information pulled into the AVS if appropriate.     Flor Perera, APRN  05/04/2022

## 2022-05-04 ENCOUNTER — NURSE NAVIGATOR (OUTPATIENT)
Dept: OTHER | Facility: HOSPITAL | Age: 57
End: 2022-05-04

## 2022-05-04 ENCOUNTER — OFFICE VISIT (OUTPATIENT)
Dept: PLASTIC SURGERY | Facility: CLINIC | Age: 57
End: 2022-05-04

## 2022-05-04 ENCOUNTER — PATIENT ROUNDING (BHMG ONLY) (OUTPATIENT)
Dept: PLASTIC SURGERY | Facility: CLINIC | Age: 57
End: 2022-05-04

## 2022-05-04 VITALS
SYSTOLIC BLOOD PRESSURE: 122 MMHG | HEART RATE: 90 BPM | HEIGHT: 68 IN | BODY MASS INDEX: 32.1 KG/M2 | DIASTOLIC BLOOD PRESSURE: 81 MMHG | WEIGHT: 211.8 LBS | TEMPERATURE: 98.2 F | OXYGEN SATURATION: 100 %

## 2022-05-04 DIAGNOSIS — D05.12 DUCTAL CARCINOMA IN SITU (DCIS) OF LEFT BREAST: Primary | ICD-10-CM

## 2022-05-04 PROBLEM — L25.9 CONTACT DERMATITIS: Status: ACTIVE | Noted: 2017-06-30

## 2022-05-04 PROBLEM — N95.0 POSTMENOPAUSAL BLEEDING: Status: ACTIVE | Noted: 2018-05-18

## 2022-05-04 PROBLEM — Z01.419 NORMAL PELVIC EXAM: Status: ACTIVE | Noted: 2018-05-18

## 2022-05-04 PROBLEM — M25.50 ARTHRALGIA: Status: ACTIVE | Noted: 2017-01-10

## 2022-05-04 PROCEDURE — 99204 OFFICE O/P NEW MOD 45 MIN: CPT | Performed by: NURSE PRACTITIONER

## 2022-05-04 RX ORDER — UBIDECARENONE 100 MG
1000 CAPSULE ORAL DAILY
COMMUNITY
Start: 2020-10-22

## 2022-05-04 NOTE — PROGRESS NOTES
A Vimodi message has been sent to the patient for PATIENT ROUNDING with [facility] [place].

## 2022-05-04 NOTE — PROGRESS NOTES
Ms. Little called yesterday with questions and left a message. Called her back and discussed her questions. She stated her consult with plastic surgery went well today and most of her questions were addressed at that time. She was thankful for the call and will reach out with any needs that arise.

## 2022-05-05 ENCOUNTER — PATIENT ROUNDING (BHMG ONLY) (OUTPATIENT)
Dept: PLASTIC SURGERY | Facility: CLINIC | Age: 57
End: 2022-05-05

## 2022-05-05 NOTE — PROGRESS NOTES
A ICEdot message has been sent to the patient for PATIENT ROUNDING with [facility].

## 2022-05-07 ENCOUNTER — NURSE NAVIGATOR (OUTPATIENT)
Dept: OTHER | Facility: HOSPITAL | Age: 57
End: 2022-05-07

## 2022-05-07 NOTE — PROGRESS NOTES
Called Ms. Little to see how she was doing. She stated she is doing well but had a few questions about surgery scheduling. We discussed those and she was thankful for the information. Also sent a message to Dr. Mendoza's office to verify dates with her when they can. Otherwise, she has no needs at this time. She was thankful for the call and will reach out if any questions or needs arise.

## 2022-05-09 ENCOUNTER — PREP FOR SURGERY (OUTPATIENT)
Dept: OTHER | Facility: HOSPITAL | Age: 57
End: 2022-05-09

## 2022-05-09 DIAGNOSIS — D05.12 DUCTAL CARCINOMA IN SITU (DCIS) OF LEFT BREAST: Primary | ICD-10-CM

## 2022-05-09 RX ORDER — LIDOCAINE AND PRILOCAINE 25; 25 MG/G; MG/G
CREAM TOPICAL ONCE
Status: CANCELLED | OUTPATIENT
Start: 2022-06-06 | End: 2022-05-09

## 2022-05-09 RX ORDER — DIAZEPAM 5 MG/1
5 TABLET ORAL ONCE
Status: CANCELLED | OUTPATIENT
Start: 2022-06-06 | End: 2022-05-09

## 2022-05-09 RX ORDER — CEFAZOLIN SODIUM 2 G/100ML
2 INJECTION, SOLUTION INTRAVENOUS ONCE
Status: CANCELLED | OUTPATIENT
Start: 2022-06-06 | End: 2022-05-09

## 2022-05-18 ENCOUNTER — PREP FOR SURGERY (OUTPATIENT)
Dept: OTHER | Facility: HOSPITAL | Age: 57
End: 2022-05-18

## 2022-05-18 DIAGNOSIS — D05.12 DUCTAL CARCINOMA IN SITU (DCIS) OF LEFT BREAST: Primary | ICD-10-CM

## 2022-05-31 LAB
LAB AP CASE REPORT: NORMAL
LAB AP DIAGNOSIS COMMENT: NORMAL
LAB AP SPECIAL STAINS: NORMAL
PATH REPORT.ADDENDUM SPEC: NORMAL
PATH REPORT.FINAL DX SPEC: NORMAL
PATH REPORT.GROSS SPEC: NORMAL

## 2022-06-03 ENCOUNTER — PRE-ADMISSION TESTING (OUTPATIENT)
Dept: PREADMISSION TESTING | Facility: HOSPITAL | Age: 57
End: 2022-06-03

## 2022-06-03 VITALS
OXYGEN SATURATION: 96 % | TEMPERATURE: 97.2 F | RESPIRATION RATE: 20 BRPM | SYSTOLIC BLOOD PRESSURE: 127 MMHG | DIASTOLIC BLOOD PRESSURE: 81 MMHG | WEIGHT: 214 LBS | HEIGHT: 69 IN | BODY MASS INDEX: 31.7 KG/M2 | HEART RATE: 106 BPM

## 2022-06-03 DIAGNOSIS — D05.12 DUCTAL CARCINOMA IN SITU (DCIS) OF LEFT BREAST: ICD-10-CM

## 2022-06-03 LAB
ANION GAP SERPL CALCULATED.3IONS-SCNC: 12.7 MMOL/L (ref 5–15)
BUN SERPL-MCNC: 12 MG/DL (ref 6–20)
BUN/CREAT SERPL: 18.2 (ref 7–25)
CALCIUM SPEC-SCNC: 9.6 MG/DL (ref 8.6–10.5)
CHLORIDE SERPL-SCNC: 101 MMOL/L (ref 98–107)
CO2 SERPL-SCNC: 24.3 MMOL/L (ref 22–29)
CREAT SERPL-MCNC: 0.66 MG/DL (ref 0.57–1)
DEPRECATED RDW RBC AUTO: 40 FL (ref 37–54)
EGFRCR SERPLBLD CKD-EPI 2021: 102.5 ML/MIN/1.73
ERYTHROCYTE [DISTWIDTH] IN BLOOD BY AUTOMATED COUNT: 12.5 % (ref 12.3–15.4)
GLUCOSE SERPL-MCNC: 128 MG/DL (ref 65–99)
HCT VFR BLD AUTO: 44.5 % (ref 34–46.6)
HGB BLD-MCNC: 14.8 G/DL (ref 12–15.9)
MCH RBC QN AUTO: 29.1 PG (ref 26.6–33)
MCHC RBC AUTO-ENTMCNC: 33.3 G/DL (ref 31.5–35.7)
MCV RBC AUTO: 87.4 FL (ref 79–97)
PLATELET # BLD AUTO: 272 10*3/MM3 (ref 140–450)
PMV BLD AUTO: 9.8 FL (ref 6–12)
POTASSIUM SERPL-SCNC: 3.8 MMOL/L (ref 3.5–5.2)
RBC # BLD AUTO: 5.09 10*6/MM3 (ref 3.77–5.28)
SARS-COV-2 ORF1AB RESP QL NAA+PROBE: NOT DETECTED
SODIUM SERPL-SCNC: 138 MMOL/L (ref 136–145)
WBC NRBC COR # BLD: 7.27 10*3/MM3 (ref 3.4–10.8)

## 2022-06-03 PROCEDURE — C9803 HOPD COVID-19 SPEC COLLECT: HCPCS

## 2022-06-03 PROCEDURE — 36415 COLL VENOUS BLD VENIPUNCTURE: CPT

## 2022-06-03 PROCEDURE — U0004 COV-19 TEST NON-CDC HGH THRU: HCPCS

## 2022-06-03 PROCEDURE — 85027 COMPLETE CBC AUTOMATED: CPT

## 2022-06-03 PROCEDURE — 80048 BASIC METABOLIC PNL TOTAL CA: CPT

## 2022-06-03 RX ORDER — CHLORHEXIDINE GLUCONATE 500 MG/1
CLOTH TOPICAL
COMMUNITY
End: 2022-06-13

## 2022-06-03 NOTE — DISCHARGE INSTRUCTIONS
Take the following medications the morning of surgery:  NONE    ARRIVAL TIME FOR SURGERY IS 9:30 AM      If you are on prescription narcotic pain medication to control your pain you may also take that medication the morning of surgery.    General Instructions:  Do not eat solid food after midnight the night before surgery.  You may drink clear liquids day of surgery but must stop at least one hour before your hospital arrival time.  It is beneficial for you to have a clear drink that contains carbohydrates the day of surgery.  We suggest a 12 to 20 ounce bottle of Gatorade or Powerade for non-diabetic patients or a 12 to 20 ounce bottle of G2 or Powerade Zero for diabetic patients. (Pediatric patients, are not advised to drink a 12 to 20 ounce carbohydrate drink)    Clear liquids are liquids you can see through.  Nothing red in color.     Plain water                               Sports drinks  Sodas                                   Gelatin (Jell-O)  Fruit juices without pulp such as white grape juice and apple juice  Popsicles that contain no fruit or yogurt  Tea or coffee (no cream or milk added)  Gatorade / Powerade  G2 / Powerade Zero    Infants may have breast milk up to four hours before surgery.  Infants drinking formula may drink formula up to six hours before surgery.   Patients who avoid smoking, chewing tobacco and alcohol for 4 weeks prior to surgery have a reduced risk of post-operative complications.  Quit smoking as many days before surgery as you can.  Do not smoke, use chewing tobacco or drink alcohol the day of surgery.   If applicable bring your C-PAP/ BI-PAP machine.  Bring any papers given to you in the doctor’s office.  Wear clean comfortable clothes.  Do not wear contact lenses, false eyelashes or make-up.  Bring a case for your glasses.   Bring crutches or walker if applicable.  Remove all piercings.  Leave jewelry and any other valuables at home.  Hair extensions with metal clips must be  removed prior to surgery.  The Pre-Admission Testing nurse will instruct you to bring medications if unable to obtain an accurate list in Pre-Admission Testing.        If you were given a blood bank ID arm band remember to bring it with you the day of surgery.    Preventing a Surgical Site Infection:  For 2 to 3 days before surgery, avoid shaving with a razor because the razor can irritate skin and make it easier to develop an infection.    Any areas of open skin can increase the risk of a post-operative wound infection by allowing bacteria to enter and travel throughout the body.  Notify your surgeon if you have any skin wounds / rashes even if it is not near the expected surgical site.  The area will need assessed to determine if surgery should be delayed until it is healed.  The night prior to surgery shower using a fresh bar of anti-bacterial soap (such as Dial) and clean washcloth.  Sleep in a clean bed with clean clothing.  Do not allow pets to sleep with you.  Shower on the morning of surgery using a fresh bar of anti-bacterial soap (such as Dial) and clean washcloth.  Dry with a clean towel and dress in clean clothing.  Ask your surgeon if you will be receiving antibiotics prior to surgery.  Make sure you, your family, and all healthcare providers clean their hands with soap and water or an alcohol based hand  before caring for you or your wound.    CHLORHEXIDINE CLOTH INSTRUCTIONS  The morning of surgery follow these instructions using the Chlorhexidine cloths you've been given.  These steps reduce bacteria on the body.  Do not use the cloths near your eyes, ears mouth, genitalia or on open wounds.  Throw the cloths away after use but do not try to flush them down a toilet.      Open and remove one cloth at a time from the package.    Leave the cloth unfolded and begin the bathing.  Massage the skin with the cloths using gentle pressure to remove bacteria.  Do not scrub harshly.   Follow the steps  below with one 2% CHG cloth per area (6 total cloths).  One cloth for neck, shoulders and chest.  One cloth for both arms, hands, fingers and underarms (do underarms last).  One cloth for the abdomen followed by groin.  One cloth for right leg and foot including between the toes.  One cloth for left leg and foot including between the toes.  The last cloth is to be used for the back of the neck, back and buttocks.    Allow the CHG to air dry 3 minutes on the skin which will give it time to work and decrease the chance of irritation.  The skin may feel sticky until it is dry.  Do not rinse with water or any other liquid or you will lose the beneficial effects of the CHG.  If mild skin irritation occurs, do rinse the skin to remove the CHG.  Report this to the nurse at time of admission.  Do not apply lotions, creams, ointments, deodorants or perfumes after using the clothes. Dress in clean clothes before coming to the hospital.     Day of surgery:  Your arrival time is approximately two hours before your scheduled surgery time.  Upon arrival, a Pre-op nurse and Anesthesiologist will review your health history, obtain vital signs, and answer questions you may have.  The only belongings needed at this time will be a list of your home medications and if applicable your C-PAP/BI-PAP machine.  A Pre-op nurse will start an IV and you may receive medication in preparation for surgery, including something to help you relax.     Please be aware that surgery does come with discomfort.  We want to make every effort to control your discomfort so please discuss any uncontrolled symptoms with your nurse.   Your doctor will most likely have prescribed pain medications.      If you are going home after surgery you will receive individualized written care instructions before being discharged.  A responsible adult must drive you to and from the hospital on the day of your surgery and stay with you for 24 hours.  Discharge prescriptions  can be filled by the hospital pharmacy during regular pharmacy hours.  If you are having surgery late in the day/evening your prescription may be e-prescribed to your pharmacy.  Please verify your pharmacy hours or chose a 24 hour pharmacy to avoid not having access to your prescription because your pharmacy has closed for the day.    If you are staying overnight following surgery, you will be transported to your hospital room following the recovery period.  [facility] has all private rooms.    If you have any questions please call Pre-Admission Testing at [phone].  Deductibles and co-payments are collected on the day of service. Please be prepared to pay the required co-pay, deductible or deposit on the day of service as defined by your plan.    Patient Education for Self-Quarantine Process    Following your COVID testing, we strongly recommend that you wear a mask when you are with other people and practice social distancing.   Limit your activities to only required outings.  Wash your hands with soap and water frequently for at least 20 seconds.   Avoid touching your eyes, nose and mouth with unwashed hands.  Do not share anything - utensils, drinking glasses, food from the same bowl.   Sanitize household surfaces daily. Include all high touch areas (door handles, light switches, phones, countertops, etc.)    Call your surgeon immediately if you experience any of the following symptoms:  Sore Throat  Shortness of Breath or difficulty breathing  Cough  Chills  Body soreness or muscle pain  Headache  Fever  New loss of taste or smell  Do not arrive for your surgery ill.  Your procedure will need to be rescheduled to another time.  You will need to call your physician before the day of surgery to avoid any unnecessary exposure to hospital staff as well as other patients.

## 2022-06-06 ENCOUNTER — HOSPITAL ENCOUNTER (OUTPATIENT)
Facility: HOSPITAL | Age: 57
Discharge: HOME | End: 2022-06-07
Attending: STUDENT IN AN ORGANIZED HEALTH CARE EDUCATION/TRAINING PROGRAM | Admitting: STUDENT IN AN ORGANIZED HEALTH CARE EDUCATION/TRAINING PROGRAM

## 2022-06-06 ENCOUNTER — HOSPITAL ENCOUNTER (OUTPATIENT)
Dept: NUCLEAR MEDICINE | Facility: HOSPITAL | Age: 57
Discharge: HOME | End: 2022-06-06

## 2022-06-06 ENCOUNTER — ANESTHESIA (OUTPATIENT)
Dept: PERIOP | Facility: HOSPITAL | Age: 57
End: 2022-06-06

## 2022-06-06 ENCOUNTER — ANESTHESIA EVENT (OUTPATIENT)
Dept: PERIOP | Facility: HOSPITAL | Age: 57
End: 2022-06-06

## 2022-06-06 DIAGNOSIS — D05.12 DUCTAL CARCINOMA IN SITU (DCIS) OF LEFT BREAST: ICD-10-CM

## 2022-06-06 PROCEDURE — 19318 BREAST REDUCTION: CPT | Performed by: SURGERY

## 2022-06-06 PROCEDURE — 88305 TISSUE EXAM BY PATHOLOGIST: CPT | Performed by: STUDENT IN AN ORGANIZED HEALTH CARE EDUCATION/TRAINING PROGRAM

## 2022-06-06 PROCEDURE — 15777 ACELLULAR DERM MATRIX IMPLT: CPT | Performed by: SURGERY

## 2022-06-06 PROCEDURE — 25010000002 CEFAZOLIN PER 500 MG: Performed by: STUDENT IN AN ORGANIZED HEALTH CARE EDUCATION/TRAINING PROGRAM

## 2022-06-06 PROCEDURE — G0378 HOSPITAL OBSERVATION PER HR: HCPCS

## 2022-06-06 PROCEDURE — 19340 INSJ BREAST IMPLT SM D MAST: CPT | Performed by: SURGERY

## 2022-06-06 PROCEDURE — 25010000002 ONDANSETRON PER 1 MG: Performed by: NURSE ANESTHETIST, CERTIFIED REGISTERED

## 2022-06-06 PROCEDURE — 19303 MAST SIMPLE COMPLETE: CPT | Performed by: SPECIALIST/TECHNOLOGIST, OTHER

## 2022-06-06 PROCEDURE — 0 TECHETIUM TC99M TILMANOCEPT: Performed by: STUDENT IN AN ORGANIZED HEALTH CARE EDUCATION/TRAINING PROGRAM

## 2022-06-06 PROCEDURE — 25010000002 CEFAZOLIN IN DEXTROSE 2-4 GM/100ML-% SOLUTION: Performed by: STUDENT IN AN ORGANIZED HEALTH CARE EDUCATION/TRAINING PROGRAM

## 2022-06-06 PROCEDURE — 25010000002 PROPOFOL 10 MG/ML EMULSION: Performed by: NURSE ANESTHETIST, CERTIFIED REGISTERED

## 2022-06-06 PROCEDURE — 25010000002 DEXAMETHASONE PER 1 MG: Performed by: NURSE ANESTHETIST, CERTIFIED REGISTERED

## 2022-06-06 PROCEDURE — 25010000002 PHENYLEPHRINE 10 MG/ML SOLUTION: Performed by: NURSE ANESTHETIST, CERTIFIED REGISTERED

## 2022-06-06 PROCEDURE — 38792 RA TRACER ID OF SENTINL NODE: CPT

## 2022-06-06 PROCEDURE — 25010000002 FENTANYL CITRATE (PF) 50 MCG/ML SOLUTION: Performed by: NURSE ANESTHETIST, CERTIFIED REGISTERED

## 2022-06-06 PROCEDURE — 25010000002 NEOSTIGMINE 5 MG/10ML SOLUTION: Performed by: NURSE ANESTHETIST, CERTIFIED REGISTERED

## 2022-06-06 PROCEDURE — C1789 PROSTHESIS, BREAST, IMP: HCPCS | Performed by: STUDENT IN AN ORGANIZED HEALTH CARE EDUCATION/TRAINING PROGRAM

## 2022-06-06 PROCEDURE — A9520 TC99 TILMANOCEPT DIAG 0.5MCI: HCPCS | Performed by: STUDENT IN AN ORGANIZED HEALTH CARE EDUCATION/TRAINING PROGRAM

## 2022-06-06 PROCEDURE — 88307 TISSUE EXAM BY PATHOLOGIST: CPT | Performed by: STUDENT IN AN ORGANIZED HEALTH CARE EDUCATION/TRAINING PROGRAM

## 2022-06-06 PROCEDURE — 25010000002 HYDROMORPHONE PER 4 MG: Performed by: NURSE ANESTHETIST, CERTIFIED REGISTERED

## 2022-06-06 PROCEDURE — 19303 MAST SIMPLE COMPLETE: CPT | Performed by: STUDENT IN AN ORGANIZED HEALTH CARE EDUCATION/TRAINING PROGRAM

## 2022-06-06 PROCEDURE — 25010000002 GENTAMICIN PER 80 MG: Performed by: STUDENT IN AN ORGANIZED HEALTH CARE EDUCATION/TRAINING PROGRAM

## 2022-06-06 PROCEDURE — 38525 BIOPSY/REMOVAL LYMPH NODES: CPT | Performed by: STUDENT IN AN ORGANIZED HEALTH CARE EDUCATION/TRAINING PROGRAM

## 2022-06-06 PROCEDURE — 25010000002 DEXAMETHASONE SODIUM PHOSPHATE 10 MG/ML SOLUTION 1 ML VIAL: Performed by: STUDENT IN AN ORGANIZED HEALTH CARE EDUCATION/TRAINING PROGRAM

## 2022-06-06 PROCEDURE — 88342 IMHCHEM/IMCYTCHM 1ST ANTB: CPT | Performed by: STUDENT IN AN ORGANIZED HEALTH CARE EDUCATION/TRAINING PROGRAM

## 2022-06-06 DEVICE — LIGACLIP MCA MULTIPLE CLIP APPLIERS, 20 MEDIUM CLIPS
Type: IMPLANTABLE DEVICE | Site: BREAST | Status: FUNCTIONAL
Brand: LIGACLIP

## 2022-06-06 DEVICE — GRFT TISS ALLODERM SELCT RTM CONTR PERF MD/THK 200CM/SQ XL: Type: IMPLANTABLE DEVICE | Site: BREAST | Status: FUNCTIONAL

## 2022-06-06 DEVICE — IMPLANTABLE DEVICE: Type: IMPLANTABLE DEVICE | Site: BREAST | Status: FUNCTIONAL

## 2022-06-06 RX ORDER — DIPHENHYDRAMINE HCL 25 MG
25 CAPSULE ORAL
Status: DISCONTINUED | OUTPATIENT
Start: 2022-06-06 | End: 2022-06-06 | Stop reason: HOSPADM

## 2022-06-06 RX ORDER — DEXAMETHASONE SODIUM PHOSPHATE 10 MG/ML
INJECTION INTRAMUSCULAR; INTRAVENOUS AS NEEDED
Status: DISCONTINUED | OUTPATIENT
Start: 2022-06-06 | End: 2022-06-06 | Stop reason: SURG

## 2022-06-06 RX ORDER — FENTANYL CITRATE 50 UG/ML
INJECTION, SOLUTION INTRAMUSCULAR; INTRAVENOUS AS NEEDED
Status: DISCONTINUED | OUTPATIENT
Start: 2022-06-06 | End: 2022-06-06 | Stop reason: SURG

## 2022-06-06 RX ORDER — LIDOCAINE HYDROCHLORIDE 20 MG/ML
INJECTION, SOLUTION INFILTRATION; PERINEURAL AS NEEDED
Status: DISCONTINUED | OUTPATIENT
Start: 2022-06-06 | End: 2022-06-06 | Stop reason: SURG

## 2022-06-06 RX ORDER — NEOSTIGMINE METHYLSULFATE 0.5 MG/ML
INJECTION, SOLUTION INTRAVENOUS AS NEEDED
Status: DISCONTINUED | OUTPATIENT
Start: 2022-06-06 | End: 2022-06-06 | Stop reason: SURG

## 2022-06-06 RX ORDER — SCOLOPAMINE TRANSDERMAL SYSTEM 1 MG/1
1 PATCH, EXTENDED RELEASE TRANSDERMAL ONCE
Status: DISCONTINUED | OUTPATIENT
Start: 2022-06-06 | End: 2022-06-06

## 2022-06-06 RX ORDER — FAMOTIDINE 10 MG/ML
20 INJECTION, SOLUTION INTRAVENOUS ONCE
Status: COMPLETED | OUTPATIENT
Start: 2022-06-06 | End: 2022-06-06

## 2022-06-06 RX ORDER — IBUPROFEN 800 MG/1
800 TABLET ORAL EVERY 8 HOURS
Status: DISCONTINUED | OUTPATIENT
Start: 2022-06-06 | End: 2022-06-07 | Stop reason: HOSPADM

## 2022-06-06 RX ORDER — ESMOLOL HYDROCHLORIDE 10 MG/ML
INJECTION INTRAVENOUS AS NEEDED
Status: DISCONTINUED | OUTPATIENT
Start: 2022-06-06 | End: 2022-06-06 | Stop reason: SURG

## 2022-06-06 RX ORDER — HYDROMORPHONE HYDROCHLORIDE 1 MG/ML
0.5 INJECTION, SOLUTION INTRAMUSCULAR; INTRAVENOUS; SUBCUTANEOUS
Status: DISCONTINUED | OUTPATIENT
Start: 2022-06-06 | End: 2022-06-07 | Stop reason: HOSPADM

## 2022-06-06 RX ORDER — SODIUM CHLORIDE 0.9 % (FLUSH) 0.9 %
3-10 SYRINGE (ML) INJECTION AS NEEDED
Status: DISCONTINUED | OUTPATIENT
Start: 2022-06-06 | End: 2022-06-06 | Stop reason: HOSPADM

## 2022-06-06 RX ORDER — FENTANYL CITRATE 50 UG/ML
50 INJECTION, SOLUTION INTRAMUSCULAR; INTRAVENOUS
Status: DISCONTINUED | OUTPATIENT
Start: 2022-06-06 | End: 2022-06-06 | Stop reason: HOSPADM

## 2022-06-06 RX ORDER — GLYCOPYRROLATE 0.2 MG/ML
INJECTION INTRAMUSCULAR; INTRAVENOUS AS NEEDED
Status: DISCONTINUED | OUTPATIENT
Start: 2022-06-06 | End: 2022-06-06 | Stop reason: SURG

## 2022-06-06 RX ORDER — HYDROMORPHONE HCL 110MG/55ML
PATIENT CONTROLLED ANALGESIA SYRINGE INTRAVENOUS AS NEEDED
Status: DISCONTINUED | OUTPATIENT
Start: 2022-06-06 | End: 2022-06-06 | Stop reason: SURG

## 2022-06-06 RX ORDER — HYDROMORPHONE HYDROCHLORIDE 1 MG/ML
0.5 INJECTION, SOLUTION INTRAMUSCULAR; INTRAVENOUS; SUBCUTANEOUS
Status: DISCONTINUED | OUTPATIENT
Start: 2022-06-06 | End: 2022-06-06 | Stop reason: HOSPADM

## 2022-06-06 RX ORDER — ONDANSETRON 2 MG/ML
4 INJECTION INTRAMUSCULAR; INTRAVENOUS ONCE AS NEEDED
Status: DISCONTINUED | OUTPATIENT
Start: 2022-06-06 | End: 2022-06-06 | Stop reason: HOSPADM

## 2022-06-06 RX ORDER — PROMETHAZINE HYDROCHLORIDE 25 MG/1
25 SUPPOSITORY RECTAL ONCE AS NEEDED
Status: DISCONTINUED | OUTPATIENT
Start: 2022-06-06 | End: 2022-06-06 | Stop reason: HOSPADM

## 2022-06-06 RX ORDER — PHENYLEPHRINE HYDROCHLORIDE 10 MG/ML
INJECTION INTRAVENOUS AS NEEDED
Status: DISCONTINUED | OUTPATIENT
Start: 2022-06-06 | End: 2022-06-06 | Stop reason: SURG

## 2022-06-06 RX ORDER — FLUMAZENIL 0.1 MG/ML
0.2 INJECTION INTRAVENOUS AS NEEDED
Status: DISCONTINUED | OUTPATIENT
Start: 2022-06-06 | End: 2022-06-06 | Stop reason: HOSPADM

## 2022-06-06 RX ORDER — MAGNESIUM HYDROXIDE 1200 MG/15ML
LIQUID ORAL AS NEEDED
Status: DISCONTINUED | OUTPATIENT
Start: 2022-06-06 | End: 2022-06-06 | Stop reason: HOSPADM

## 2022-06-06 RX ORDER — GABAPENTIN 100 MG/1
100 CAPSULE ORAL EVERY 8 HOURS SCHEDULED
Status: DISCONTINUED | OUTPATIENT
Start: 2022-06-06 | End: 2022-06-07 | Stop reason: HOSPADM

## 2022-06-06 RX ORDER — SODIUM CHLORIDE, SODIUM LACTATE, POTASSIUM CHLORIDE, CALCIUM CHLORIDE 600; 310; 30; 20 MG/100ML; MG/100ML; MG/100ML; MG/100ML
9 INJECTION, SOLUTION INTRAVENOUS CONTINUOUS
Status: DISCONTINUED | OUTPATIENT
Start: 2022-06-06 | End: 2022-06-07 | Stop reason: HOSPADM

## 2022-06-06 RX ORDER — OXYCODONE HYDROCHLORIDE 5 MG/1
5 TABLET ORAL EVERY 4 HOURS PRN
Status: DISCONTINUED | OUTPATIENT
Start: 2022-06-06 | End: 2022-06-07 | Stop reason: HOSPADM

## 2022-06-06 RX ORDER — DIPHENHYDRAMINE HYDROCHLORIDE 50 MG/ML
12.5 INJECTION INTRAMUSCULAR; INTRAVENOUS
Status: DISCONTINUED | OUTPATIENT
Start: 2022-06-06 | End: 2022-06-06 | Stop reason: HOSPADM

## 2022-06-06 RX ORDER — PROPOFOL 10 MG/ML
VIAL (ML) INTRAVENOUS AS NEEDED
Status: DISCONTINUED | OUTPATIENT
Start: 2022-06-06 | End: 2022-06-06 | Stop reason: SURG

## 2022-06-06 RX ORDER — MIDAZOLAM HYDROCHLORIDE 1 MG/ML
1 INJECTION INTRAMUSCULAR; INTRAVENOUS
Status: DISCONTINUED | OUTPATIENT
Start: 2022-06-06 | End: 2022-06-06 | Stop reason: HOSPADM

## 2022-06-06 RX ORDER — HYDROCODONE BITARTRATE AND ACETAMINOPHEN 7.5; 325 MG/1; MG/1
1 TABLET ORAL ONCE AS NEEDED
Status: DISCONTINUED | OUTPATIENT
Start: 2022-06-06 | End: 2022-06-06 | Stop reason: HOSPADM

## 2022-06-06 RX ORDER — ROCURONIUM BROMIDE 10 MG/ML
INJECTION, SOLUTION INTRAVENOUS AS NEEDED
Status: DISCONTINUED | OUTPATIENT
Start: 2022-06-06 | End: 2022-06-06 | Stop reason: SURG

## 2022-06-06 RX ORDER — ACETAMINOPHEN 500 MG
1000 TABLET ORAL EVERY 8 HOURS
Status: DISCONTINUED | OUTPATIENT
Start: 2022-06-06 | End: 2022-06-07 | Stop reason: HOSPADM

## 2022-06-06 RX ORDER — ONDANSETRON 2 MG/ML
INJECTION INTRAMUSCULAR; INTRAVENOUS AS NEEDED
Status: DISCONTINUED | OUTPATIENT
Start: 2022-06-06 | End: 2022-06-06 | Stop reason: SURG

## 2022-06-06 RX ORDER — LABETALOL HYDROCHLORIDE 5 MG/ML
5 INJECTION, SOLUTION INTRAVENOUS
Status: DISCONTINUED | OUTPATIENT
Start: 2022-06-06 | End: 2022-06-06 | Stop reason: HOSPADM

## 2022-06-06 RX ORDER — LIDOCAINE AND PRILOCAINE 25; 25 MG/G; MG/G
CREAM TOPICAL ONCE
Status: COMPLETED | OUTPATIENT
Start: 2022-06-06 | End: 2022-06-06

## 2022-06-06 RX ORDER — NALOXONE HCL 0.4 MG/ML
0.2 VIAL (ML) INJECTION AS NEEDED
Status: DISCONTINUED | OUTPATIENT
Start: 2022-06-06 | End: 2022-06-06 | Stop reason: HOSPADM

## 2022-06-06 RX ORDER — BUPIVACAINE HYDROCHLORIDE AND EPINEPHRINE 5; 5 MG/ML; UG/ML
INJECTION, SOLUTION PERINEURAL AS NEEDED
Status: DISCONTINUED | OUTPATIENT
Start: 2022-06-06 | End: 2022-06-06 | Stop reason: HOSPADM

## 2022-06-06 RX ORDER — EPHEDRINE SULFATE 50 MG/ML
5 INJECTION, SOLUTION INTRAVENOUS ONCE AS NEEDED
Status: DISCONTINUED | OUTPATIENT
Start: 2022-06-06 | End: 2022-06-06 | Stop reason: HOSPADM

## 2022-06-06 RX ORDER — ONDANSETRON 2 MG/ML
4 INJECTION INTRAMUSCULAR; INTRAVENOUS EVERY 6 HOURS PRN
Status: DISCONTINUED | OUTPATIENT
Start: 2022-06-06 | End: 2022-06-07 | Stop reason: HOSPADM

## 2022-06-06 RX ORDER — CEFAZOLIN SODIUM 2 G/100ML
2 INJECTION, SOLUTION INTRAVENOUS ONCE
Status: COMPLETED | OUTPATIENT
Start: 2022-06-06 | End: 2022-06-06

## 2022-06-06 RX ORDER — SODIUM CHLORIDE 0.9 % (FLUSH) 0.9 %
10 SYRINGE (ML) INJECTION EVERY 12 HOURS SCHEDULED
Status: DISCONTINUED | OUTPATIENT
Start: 2022-06-06 | End: 2022-06-07 | Stop reason: HOSPADM

## 2022-06-06 RX ORDER — SODIUM CHLORIDE 0.9 % (FLUSH) 0.9 %
3 SYRINGE (ML) INJECTION EVERY 12 HOURS SCHEDULED
Status: DISCONTINUED | OUTPATIENT
Start: 2022-06-06 | End: 2022-06-06 | Stop reason: HOSPADM

## 2022-06-06 RX ORDER — SODIUM CHLORIDE 0.9 % (FLUSH) 0.9 %
10 SYRINGE (ML) INJECTION AS NEEDED
Status: DISCONTINUED | OUTPATIENT
Start: 2022-06-06 | End: 2022-06-07 | Stop reason: HOSPADM

## 2022-06-06 RX ORDER — PROMETHAZINE HYDROCHLORIDE 25 MG/1
25 TABLET ORAL ONCE AS NEEDED
Status: DISCONTINUED | OUTPATIENT
Start: 2022-06-06 | End: 2022-06-06 | Stop reason: HOSPADM

## 2022-06-06 RX ORDER — DIAZEPAM 5 MG/1
5 TABLET ORAL ONCE
Status: COMPLETED | OUTPATIENT
Start: 2022-06-06 | End: 2022-06-06

## 2022-06-06 RX ORDER — HYDRALAZINE HYDROCHLORIDE 20 MG/ML
5 INJECTION INTRAMUSCULAR; INTRAVENOUS
Status: DISCONTINUED | OUTPATIENT
Start: 2022-06-06 | End: 2022-06-06 | Stop reason: HOSPADM

## 2022-06-06 RX ORDER — OXYCODONE AND ACETAMINOPHEN 7.5; 325 MG/1; MG/1
1 TABLET ORAL EVERY 4 HOURS PRN
Status: DISCONTINUED | OUTPATIENT
Start: 2022-06-06 | End: 2022-06-06 | Stop reason: HOSPADM

## 2022-06-06 RX ORDER — LIDOCAINE HYDROCHLORIDE 10 MG/ML
0.5 INJECTION, SOLUTION EPIDURAL; INFILTRATION; INTRACAUDAL; PERINEURAL ONCE AS NEEDED
Status: DISCONTINUED | OUTPATIENT
Start: 2022-06-06 | End: 2022-06-06 | Stop reason: HOSPADM

## 2022-06-06 RX ADMIN — PHENYLEPHRINE HYDROCHLORIDE 100 MCG: 10 INJECTION, SOLUTION INTRAVENOUS at 13:31

## 2022-06-06 RX ADMIN — SCOPALAMINE 1 PATCH: 1 PATCH, EXTENDED RELEASE TRANSDERMAL at 10:50

## 2022-06-06 RX ADMIN — FENTANYL CITRATE 25 MCG: 0.05 INJECTION, SOLUTION INTRAMUSCULAR; INTRAVENOUS at 13:01

## 2022-06-06 RX ADMIN — ACETAMINOPHEN 1000 MG: 500 TABLET ORAL at 16:31

## 2022-06-06 RX ADMIN — SODIUM CHLORIDE, POTASSIUM CHLORIDE, SODIUM LACTATE AND CALCIUM CHLORIDE 9 ML/HR: 600; 310; 30; 20 INJECTION, SOLUTION INTRAVENOUS at 10:43

## 2022-06-06 RX ADMIN — ONDANSETRON 4 MG: 2 INJECTION INTRAMUSCULAR; INTRAVENOUS at 14:40

## 2022-06-06 RX ADMIN — DEXAMETHASONE SODIUM PHOSPHATE 8 MG: 10 INJECTION INTRAMUSCULAR; INTRAVENOUS at 12:41

## 2022-06-06 RX ADMIN — GLYCOPYRROLATE 0.2 MG: 0.2 INJECTION INTRAMUSCULAR; INTRAVENOUS at 12:36

## 2022-06-06 RX ADMIN — LIDOCAINE HYDROCHLORIDE 100 MG: 20 INJECTION, SOLUTION INFILTRATION; PERINEURAL at 12:36

## 2022-06-06 RX ADMIN — GABAPENTIN 100 MG: 100 CAPSULE ORAL at 21:04

## 2022-06-06 RX ADMIN — TILMANOCEPT 1 DOSE: KIT at 11:35

## 2022-06-06 RX ADMIN — FENTANYL CITRATE 25 MCG: 0.05 INJECTION, SOLUTION INTRAMUSCULAR; INTRAVENOUS at 13:46

## 2022-06-06 RX ADMIN — IBUPROFEN 800 MG: 800 TABLET, FILM COATED ORAL at 21:04

## 2022-06-06 RX ADMIN — DIAZEPAM 5 MG: 5 TABLET ORAL at 10:42

## 2022-06-06 RX ADMIN — FENTANYL CITRATE 25 MCG: 0.05 INJECTION, SOLUTION INTRAMUSCULAR; INTRAVENOUS at 13:05

## 2022-06-06 RX ADMIN — OXYCODONE 5 MG: 5 TABLET ORAL at 21:04

## 2022-06-06 RX ADMIN — GABAPENTIN 100 MG: 100 CAPSULE ORAL at 16:31

## 2022-06-06 RX ADMIN — SODIUM CHLORIDE, POTASSIUM CHLORIDE, SODIUM LACTATE AND CALCIUM CHLORIDE: 600; 310; 30; 20 INJECTION, SOLUTION INTRAVENOUS at 14:11

## 2022-06-06 RX ADMIN — OXYCODONE 5 MG: 5 TABLET ORAL at 16:32

## 2022-06-06 RX ADMIN — PROPOFOL 25 MCG/KG/MIN: 10 INJECTION, EMULSION INTRAVENOUS at 12:40

## 2022-06-06 RX ADMIN — ROCURONIUM BROMIDE 50 MG: 50 INJECTION INTRAVENOUS at 12:36

## 2022-06-06 RX ADMIN — Medication 10 ML: at 21:06

## 2022-06-06 RX ADMIN — PROPOFOL 200 MG: 10 INJECTION, EMULSION INTRAVENOUS at 12:36

## 2022-06-06 RX ADMIN — LIDOCAINE AND PRILOCAINE: 25; 25 CREAM TOPICAL at 10:19

## 2022-06-06 RX ADMIN — NEOSTIGMINE METHYLSULFATE 2.5 MG: 0.5 INJECTION INTRAVENOUS at 14:40

## 2022-06-06 RX ADMIN — FENTANYL CITRATE 50 MCG: 50 INJECTION INTRAMUSCULAR; INTRAVENOUS at 15:50

## 2022-06-06 RX ADMIN — FAMOTIDINE 20 MG: 10 INJECTION, SOLUTION INTRAVENOUS at 10:50

## 2022-06-06 RX ADMIN — GLYCOPYRROLATE 0.4 MG: 0.2 INJECTION INTRAMUSCULAR; INTRAVENOUS at 14:40

## 2022-06-06 RX ADMIN — FENTANYL CITRATE 50 MCG: 0.05 INJECTION, SOLUTION INTRAMUSCULAR; INTRAVENOUS at 12:53

## 2022-06-06 RX ADMIN — ESMOLOL HYDROCHLORIDE 10 MG: 100 INJECTION, SOLUTION INTRAVENOUS at 13:17

## 2022-06-06 RX ADMIN — CEFAZOLIN SODIUM 2 G: 2 INJECTION, SOLUTION INTRAVENOUS at 12:25

## 2022-06-06 RX ADMIN — HYDROMORPHONE HYDROCHLORIDE 0.5 MG: 2 INJECTION, SOLUTION INTRAMUSCULAR; INTRAVENOUS; SUBCUTANEOUS at 12:55

## 2022-06-06 RX ADMIN — FENTANYL CITRATE 50 MCG: 0.05 INJECTION, SOLUTION INTRAMUSCULAR; INTRAVENOUS at 12:36

## 2022-06-06 NOTE — H&P
Update: No changes to H&P. Plan for L breast skin sparing mastectomy and SLN biopsy today in the OR.     General Surgery Breast Cancer History and Physical Exam      Summary:    Kaylee Little is a 56 y.o. lady. The patient presents with a new diagnosis of left breast DCIS: Grade II,  ER+/RI-; cTisN0.       A multidisciplinary plan has been formulated for the patient:    (1) Breast Surgical Oncology:  -Follow up [employer] 9 panel genetic testing. I will call her with results.   -MRI to evaluate anatomy as well as for surgical planning. Planned for 4/20/2022   -Nurse navigator consult.   -Surgical plan: Likely plan for L breast wire localized lumpectomy pending MRI results.   -Plastic surgery referral deferred.      (2) Medical Oncology:  -Will refer postoperatively for evaluation for endocrine therapy. Would like to see oncology in [place].      (3) Radiation Oncology:  -Will refer postoperatively for evaluation for radiation therapy. Would like to see radiation at [place].      Referring Provider: No ref. provider found     Chief Complaint: abnormal breast imaging     History of Present Illness: Ms. Kaylee Little is a 56 y.o. year old lady, seen at the request of No ref. provider found for a new diagnosis of left breast cancer.       This was initially detected as an imaging abnormality. She has had annual mammograms each year. She denies any prior history of abnormal mammograms or breast biopsies. Her work-up is detailed in the oncologic history below.      She denies any breast lumps, pain, skin changes, or nipple discharge.      Workup of Current Diagnosis:    3/14/2022 Bilateral Screening Mammogram:  IMPRESSION:  Indeterminate left breast calcifications. Recommend left diagnostic  mammogram.  BI-RADS ASSESSMENT: BI-RADS 0. Incomplete     3/25/2022 Left Breast Diagnostic Mammogram:   IMPRESSION:  Suspicious left breast microcalcifications. I would recommend that the patient undergo a stereotactic guided  percutaneous vacuum assisted core biopsy procedure.  ASSESSMENT:  BIRADS:  BI-RADS 4. Suspicious abnormality.     4/5/2022 Left Breast Stereotactic Biopsy:   An 8 gauge vacuum-assisted core needle biopsy device was advanced into the breast. Targeting was confirmed with prefire radiographs. 6 specimens were obtained. Specimen radiograph was then performed confirming the presence of calcifications within the specimens. A barbell-shaped clip  was then placed. The biopsy clip is in expected position in the biopsy bed.   IMPRESSION :   Technically successful stereotactic biopsy of the breast, with the biopsy clip in expected position. After final pathology has been reviewed, an addendum will be dictated for concordance and further recommendations.      4/5/2022 Pathology:   Final Diagnosis   Calcifications, left breast, biopsies:    Ductal carcinoma in situ, clinging type, intermediate nuclear grade with central necrosis    No invasive carcinoma is identified    Microcalcifications identified within neoplastic and benign duct spaces     4/20/2022 Bilateral Breast MRI   IMPRESSION:  1. Biopsy-proven malignancy in the left breast centered at the 12 o'clock position with the barbell-shaped metallic clip within a postbiopsy cavity. Nonspecific enhancement is seen surrounding the cavity. There is no evidence for left axillary adenopathy.  2. There is linear branching enhancement seen in the middle third medial aspect of the left breast at the 9 o'clock position. No mammographic correlate is appreciated. If breast conservation therapy is desired, correlation with an MR guided left breast biopsy should be considered.  3. Linear, borderline clumped enhancement that measures on the order 3.5 cm in greatest dimension in the middle third upper outer quadrant of the left breast at the 2 o'clock position without a mammographic correlate. If breast conservation therapy is desired, correlation with an MR guided left breast biopsy  should be considered.  4. There are no findings suspicious for malignancy in the right breast.  BI-RADS category 4: Suspicious. Biopsy should be considered.     Past Medical History:   · None     Past Surgical History:    · D&C hysteroscopy      Family History:    · Sister with non-Hodgkins lymphoma      Social History:  · Denies tobacco use  · Occasional alcohol use     Allergies:   No Known Allergies     Medications:      Current Outpatient Medications:   •  Calcium Carbonate-Vitamin D (Calcium 600+D) 600-200 MG-UNIT tablet, Take  by mouth 2 (two) times a day., Disp: , Rfl:   •  cetirizine (zyrTEC) 10 MG tablet, Take 10 mg by mouth Daily., Disp: , Rfl:   •  metroNIDAZOLE (Metrogel) 1 % gel, Apply  topically to the appropriate area as directed Daily., Disp: 180 g, Rfl: 0  •  multivitamin with minerals tablet tablet, , Disp: , Rfl:   •  naproxen sodium (ALEVE) 220 MG tablet, Take 440 mg by mouth Daily., Disp: , Rfl:   •  Omega-3 Fatty Acids (FISH OIL PO), FISH OIL CAPS, Disp: , Rfl:      Laboratory Values:    No recent labs     Review of Systems:   Influenza-like illness: no fever, no  cough, no  sore throat, no  body aches, no loss of sense of taste or smell, no known exposure to person with Covid-19.  Constitutional: Negative for fevers or chills  HENT: Negative for hearing loss or runny nose  Eyes: Negative for vision changes or scleral icterus  Respiratory: Negative for cough or shortness of breath  Cardiovascular: Negative for chest pain or heart palpitations  Gastrointestinal: Negative for abdominal pain, nausea, vomiting, constipation, melena, or hematochezia  Genitourinary: Negative for hematuria or dysuria  Musculoskeletal: Negative for joint swelling or gait instability  Neurologic: Negative for tremors or seizures  Psychiatric: Negative for suicidal ideations or depression  All other systems reviewed and negative     Physical Exam:   · ECO - Asymptomatic  · Constitutional: Well-developed  well-nourished, no acute distress  · Eyes: Conjunctiva normal, sclera nonicteric  · ENMT: Hearing grossly normal, oral mucosa moist  · Neck: Supple, no palpable mass, trachea midline  · Respiratory: Clear to auscultation, normal inspiratory effort  · Cardiovascular: Regular rate, no peripheral edema, no jugular venous distention  · Breast: symmetric  ? Right: No visible abnormalities on inspection while seated, with arms raised or hands on hips. No masses, skin changes, or nipple abnormalities.  ? Left: No visible abnormalities on inspection while seated, with arms raised or hands on hips. No masses, skin changes, or nipple abnormalities.  ? Biopsy site appreciated in left outer mid breast, otherwise no skin changes.   ? No clinical chest wall involvement.  · Gastrointestinal: Soft, nontender  · Lymphatics (palpable nodes): no cervical, supraclavicular or axillary lymphadenopathy  · Skin:  Warm, dry, no rash on visualized skin surfaces  · Musculoskeletal: Symmetric strength, normal gait  · Psychiatric: Alert and oriented ×3, normal affect      Discussion:  I had an extensive discussion with the patient and her family about the nature of her breast cancer diagnosis. We reviewed the components of breast tissue including ducts and lobules. We reviewed her pathology report in detail. We reviewed breast cancer histology, including stage, grade, ER/VA receptors, HER2 receptors and how this applies to her diagnosis. We reviewed the basics of systemic and local/regional management of breast cancer.      We discussed that most breast cancer is not hereditary, that I do not believe this plays a role in her case, and that genetic testing is not warranted.      We reviewed potential surgical treatments to include partial mastectomy, mastectomy, sentinel lymph node biopsy and axillary node dissection and discussed the rationale associated with each approach. Regarding radiation therapy, we discussed that radiation is indicated  in all cases of breast conservation and in only limited circumstances following mastectomy. We discussed that the primary goal of adjuvant radiation is to decrease the likelihood of local recurrence.      In her case, she is a good candidate for lumpectomy and she would like to proceed with breast conservation. We discussed that lumpectomy would require preoperative wire-localization. We also discussed the risk of positive margins and that she must have negative margins for lumpectomy to be an appropriate oncologic procedure. I will make every effort to obtain negative margins at her initial operation, but there is a 10-15% chance that she will require a second operation for re-excision, or possibly a total mastectomy. We will not know the margin status until after her final pathology has returned.      I described additional risks and potential complications associated with surgery, including, but not limited to, bleeding, infection, deformity/poor cosmetic result, chronic pain, numbness, seroma, hematoma, deep venous thrombosis, skin flap necrosis, disease recurrence and the possibility of requiring additional surgery. We also discussed other treatment options including the option of not undergoing any surgical treatment and the risks associated with this including disease progression. She expressed an understanding of these factors and wished to proceed.     We discussed that in her case, systemic treatment would likely involve endocrine therapy/targeted therapy.      PRABHU WEBB M.D.  General and Endoscopic Surgery  [facility]     [address], [address]  [place], [place], [address]  P: [phone]  F: [phone]

## 2022-06-06 NOTE — OP NOTE
OPERATIVE REPORT     DATE: 6/6/2022     SURGEON: Eloise Mendoza MD      ASSISTANT: Destiny Raphael, who was present for necessary suctioning, retracting, suturing throughout the procedure      OPERATION PERFORMED: left breast skin sparing mastectomy with sentinel lymph node biopsy      PREOPERATIVE DIAGNOSIS: DCIS of the left breast      POSTOPERATIVE DIAGNOSIS: Same     ANESTHESIA: general      SPECIMEN:   left breast (stitch at 12:00)    Left axillary sentinel lymph node #1 (hot, blue, 230)  Left axillary sentinel lymph node #2 (hot, 30)  Left axillary sentinel lymph node #3 (hot, 20)    DRAINS: None     BLOOD LOSS: Minimal     INDICATION FOR OPERATION: Kaylee Little is a 56 y.o. lady who was recently diagnosed with left breast ductal carcinoma in situ.    Her MRI showed multifocal abnormalities in the left breast.  We discussed additional biopsies versus proceeding with left breast skin sparing mastectomy with sentinel lymph node biopsy, which is what she ultimately opted for. All risks (including bleeding, infection, damage to surrounding structures, need for further surgery, positive margins ), benefits and alternatives were explained to the patient who agreed and wished to proceed. Informed consent was signed.      OPERATIVE COURSE: The patient was taken to the operating room, transferred onto the operating room table, and underwent anesthesia without incident. 5 cc of blue dye was injected into the dermal layer of the left nipple areolar complex. The patient was prepped and draped in sterile fashion. A time out was performed and preoperative antibiotics were given. An elliptical incision was previously drawn around the nipple areolar complex by Dr. Garcia. Half percent Marcaine with epinephrine was injected into the skin and subcutaneous tissues. A scalpel was used to transect the skin and dermal layer. Subcutaneous flaps were created approximately 1cm in thickness circumferentially. These were extended to  the clavicle superiorly, the sternum medially, the inframammary fold inferiorly, and the serratus laterally. Once this was circumferentially dissected free with Bovie electrocautery, the breast was removed from the chest wall at the level of the pectoralis fascia. It was marked as listed above and passed off for fresh permanent specimen. The area was irrigated and appeared hemostatic. Bovie electrocautery was used for any small muscle bleeding. The remainder of the half percent Marcaine with epinephrine was injected into the intercostal nerve bundles and the subcutaneous tissue.    We then performed a sentinel lymph node biopsy. Bovie electrocautery was used to dissect down through the subcutaneous tissues and through the clavipectoral fascia into the axilla.  3 [place] lymph nodes were identified. These were removed with Bovie electrocautery and clips across all major lymphovascular structures. Once this was done, there were no hot, blue, or palpable lymph nodes remaining. The area was irrigated and appeared hemostatic.  These were sent for permanent specimen as she had only DCIS.    The rest of the local anesthetic was administered. Dr. Garcia then performed the reconstruction. The patient tolerated the procedure well. All needle and lap counts were correct at the end of the case. The patient was then awoken from anesthesia and taken to recovery for further monitoring.         Eloise Mendoza MD   General and Endoscopic Surgery  [facility]     [address], [address]  [place], [place], [address]  P: [phone]  F: [phone]

## 2022-06-06 NOTE — ANESTHESIA PREPROCEDURE EVALUATION
Anesthesia Evaluation     Patient summary reviewed and Nursing notes reviewed   NPO Solid Status: > 8 hours  NPO Liquid Status: > 4 hours           Airway   Mallampati: II  Neck ROM: full  No difficulty expected  Dental      Comment: Crowns and permanent retainer lower    Pulmonary     breath sounds clear to auscultation  Cardiovascular     Rhythm: regular        Neuro/Psych  GI/Hepatic/Renal/Endo      Musculoskeletal     Abdominal   (+) obese,    Substance History      OB/GYN          Other   arthritis,    history of cancer                    Anesthesia Plan    ASA 2     general     intravenous induction     Anesthetic plan, all risks, benefits, and alternatives have been provided, discussed and informed consent has been obtained with: patient.        CODE STATUS:

## 2022-06-06 NOTE — ANESTHESIA POSTPROCEDURE EVALUATION
"Patient: Kaylee Little    Procedure Summary     Date: 06/06/22 Room / Location: [facility] OR 09 / [facility] MAIN OR    Anesthesia Start: 1228 Anesthesia Stop: 1517    Procedures:       LEFT BREAST SKIN SPARING MASTECTOMY WITH LEFT AXILLARY SENTINEL NODE BIOPSY (Left Breast)      Left breast immediate reconstruction with implant and mesh, possible expander, right breast  Reduction and use of spy (Left Breast) Diagnosis:       Ductal carcinoma in situ (DCIS) of left breast      (Ductal carcinoma in situ (DCIS) of left breast [D05.12])    Surgeons: Eloise Mendoza MD; Marjorie Garcia MD Provider: Cj Albert MD    Anesthesia Type: general ASA Status: 2          Anesthesia Type: general    Vitals  Vitals Value Taken Time   /97 06/06/22 1616   Temp 36.9 °C (98.4 °F) 06/06/22 1514   Pulse 102 06/06/22 1619   Resp 16 06/06/22 1600   SpO2 99 % 06/06/22 1619   Vitals shown include unvalidated device data.        Post Anesthesia Care and Evaluation    Patient location during evaluation: bedside  Patient participation: complete - patient participated  Level of consciousness: awake and alert  Pain management: adequate  Airway patency: patent  Anesthetic complications: No anesthetic complications    Cardiovascular status: acceptable  Respiratory status: acceptable  Hydration status: acceptable    Comments: /88   Pulse 76   Temp 36.9 °C (98.4 °F) (Oral)   Resp 16   Ht 175.3 cm (69\")   Wt 97.1 kg (214 lb)   SpO2 99%   BMI 31.60 kg/m²           "

## 2022-06-06 NOTE — ANESTHESIA PROCEDURE NOTES
Airway  Urgency: elective    Date/Time: 6/6/2022 12:38 PM  Airway not difficult    General Information and Staff    Patient location during procedure: OR  Anesthesiologist: Cj Albert MD  CRNA/CAA: Ana Vernon CRNA    Indications and Patient Condition  Indications for airway management: airway protection    Preoxygenated: yes  Mask difficulty assessment: 2 - vent by mask + OA or adjuvant +/- NMBA    Final Airway Details  Final airway type: endotracheal airway      Successful airway: ETT    Successful intubation technique: direct laryngoscopy  Facilitating devices/methods: intubating stylet and anterior pressure/BURP  Blade: Loreta  Blade size: 3  ETT size (mm): 7.0  Cormack-Lehane Classification: grade IIb - view of arytenoids or posterior of glottis only  Placement verified by: chest auscultation   Measured from: lips  ETT/EBT  to lips (cm): 22  Number of attempts at approach: 1  Assessment: lips, teeth, and gum same as pre-op and atraumatic intubation

## 2022-06-06 NOTE — PLAN OF CARE
Goal Outcome Evaluation:              Outcome Evaluation: VSS. SL. 1 JAJA drain. Voided. Still needs to walk. SCDs on.

## 2022-06-06 NOTE — OP NOTE
Plastic Surgery Op Note    BILATERAL BREAST RECONSTRUCTION  WITH RIGHT REDUCTION MASTOPEXY AND LEFT IMMEDIATE RECONSTRUCTION WITH PRE PECTORAL IMPLANT AND MESH.   Kaylee LOVELACE Sidney  6/6/2022    Pre-op Diagnosis:    Diagnosis Plan   1. Ductal carcinoma in situ (DCIS) of left breast  ceFAZolin in dextrose (ANCEF) IVPB solution 2 g    diazePAM (VALIUM) tablet 5 mg    lidocaine-prilocaine (EMLA) 2.5-2.5 % cream    Tissue Pathology Exam    Tissue Pathology Exam     Macromastia     Post-Op Diagnosis Codes:   the same     Anesthesia: General    Staff:   Circulator: Magaly Mtz RN; Laurel Moore RN; Suad Gonzalez RN  Scrub Person: Lexy Peña Emily  Assistant: Destiny Raphael CSA    Surgeon: Dr Garcia    Estimated Blood Loss: 100ml    Specimens:   Order Name Source Comment Collection Info Order Time   TISSUE PATHOLOGY EXAM [place] Lymph Node  Collected By: Eloise Mendoza MD 6/6/2022  1:40 PM     Release to patient   Immediate              Drains:   Closed/Suction Drain 1 Left Breast 15 Fr. (Active)       Urethral Catheter Silicone 16 Fr. (Active)       Findings:     Resections:  R:572g g reduction  L:  1323g mastectomy     Implants:      Right:   None   Left:  Allertan Smooth round gel implant SSF  650   Alloderm select contour XL     Complications: none       Date: 6/6/2022  Time: 15:07 EDT  After risks and benefits were discussed with patient and informed consent was signed, patient was taken to the OR and positioned supine. The surgical site was then prepped in a sterile fashion way and a time out was performed confirming patient's name and procedure to be performed. All surgical team was introduced by name.   I started on the right side where I performed a right reduction/mastopexy with pedicle based superior medially.     A wise pattern was used for the  mastopexy and the landmarks were marked in the pre op area. The nipple was marked at 22 cm from the sternal notch at the meridian of the  breasts.  Both breasts were done step by step at the same time. Using a 38mm cookie cutter, the nipple was marked and incised. A keyhole wire ring was then used to outline the basic wise pattern with 6-cm lamps inferiorly. This was then carefully checked for symmetry and appeared to be satisfactory. All marks were then completed and lightly incised on both breasts. The thania-nipple site was de-epithelialized superiorly and then the inferior pedicle was de-epithelialized using cutting cautery. After this had been completed, cutting cautery was used to carry down an incision along the inferior medial and lateral aspects of the breast. This was taken down to the prepectoral fashion dissected for short distance superiorly, and then additional dissection was used to mobilize under  portion of the breast tissues to the lateral edge of the pectoral muscle. There was very little bleeding with this procedure. After this had been completed, attention was directed to the lateral side, and the inferior incision was made and taken down to the serratus. Cautery dissection was then used to carry this up superiorly over the lateral edge of the pectoral muscle to communicate with the previous pocket. After this had been completed, cutting cautery was used to cut around the inferior pedicle completely freeing the superior breast from the inferior breast. Hemostasis was obtained with electrocautery. After this had been completed, cutting cautery was used to cut along the lateral aspect of the areola with a small wedge resection for nipple repositioning. After this had been completed, final hemostasis obtained, and the wound was irrigated and a tagging suture placed to approximate the tissues in the center of the breast. The breast tissue was cleared and the nipple appeared good. Then, the breast tissue was closed and the patient was placed in a near upright position, and symmetry appeared good. The wounds were then closed with insorb  followed by  3-0 Vycril and surgical glue.  packing sutures and staples were removed as they were approached. The nipple was sutured with running intradermal 3-0 Vicry. Vascularity appeared good throughout. The patient tolerated the procedure well.       Then I proceed to the left breast reconstruction after completion of the mastectomy. The planned footprint of the reconstructed breast was marked on the chest wall. The location of the inframammary suture line was marked approximately 0.5 cm below that of the planned inframammary fold location on the reconstructed breast. A sheet of acellular dermal matrix (AlloDerm) was then placed in the breast pocket, and a 2-0 interrupted  PDS suture line was first placed horizontally between acellular dermal matrix and the underlying pectoralis muscle fibers,  where the acellular dermal matrix was then folded up and over the anterior surface of the prosthesis. At this point, a sizer was placed in the breast, and the remainder of acellular dermal matrix was pulled up and over the entire anterior surface of the sizer that was filled until the desired size. Then it was removed, the medial, superior, and lateral borders of the acellular dermal matrix were sutured to the chest wall with 2-0 PDS, at the borders of the inserted implant. Intercostal block was performed and one 15 F Lamont drain was then placed. The breast pocket was then irrigated with betadine. An implant was placed and the skin was  closed with  Two layers of 3-0 Vicryl followed by glue system .   Patient tolerated procedure well, there were no complications, all instruments were checked and patient was awakened and taken to PACU in stable condition.  I was present for the entire procedure.   Marjorie Garcia MD  06/06/22  15:07 EDT

## 2022-06-07 ENCOUNTER — READMISSION MANAGEMENT (OUTPATIENT)
Dept: CALL CENTER | Facility: HOSPITAL | Age: 57
End: 2022-06-07

## 2022-06-07 VITALS
HEART RATE: 85 BPM | BODY MASS INDEX: 31.7 KG/M2 | OXYGEN SATURATION: 94 % | SYSTOLIC BLOOD PRESSURE: 90 MMHG | DIASTOLIC BLOOD PRESSURE: 54 MMHG | RESPIRATION RATE: 16 BRPM | WEIGHT: 214 LBS | TEMPERATURE: 98.5 F | HEIGHT: 69 IN

## 2022-06-07 PROCEDURE — G0378 HOSPITAL OBSERVATION PER HR: HCPCS

## 2022-06-07 RX ORDER — PROMETHAZINE HYDROCHLORIDE 12.5 MG/1
12.5 TABLET ORAL EVERY 6 HOURS PRN
Qty: 20 TABLET | Refills: 0 | Status: SHIPPED | OUTPATIENT
Start: 2022-06-07 | End: 2022-07-01

## 2022-06-07 RX ORDER — CEPHALEXIN 500 MG/1
500 CAPSULE ORAL 4 TIMES DAILY
Qty: 12 CAPSULE | Refills: 0 | Status: SHIPPED | OUTPATIENT
Start: 2022-06-07 | End: 2022-06-10

## 2022-06-07 RX ORDER — OXYCODONE HYDROCHLORIDE AND ACETAMINOPHEN 5; 325 MG/1; MG/1
1 TABLET ORAL EVERY 6 HOURS PRN
Qty: 30 TABLET | Refills: 0 | Status: SHIPPED | OUTPATIENT
Start: 2022-06-07 | End: 2022-07-01

## 2022-06-07 RX ADMIN — OXYCODONE 5 MG: 5 TABLET ORAL at 12:26

## 2022-06-07 RX ADMIN — IBUPROFEN 800 MG: 800 TABLET, FILM COATED ORAL at 04:34

## 2022-06-07 RX ADMIN — GABAPENTIN 100 MG: 100 CAPSULE ORAL at 06:26

## 2022-06-07 RX ADMIN — OXYCODONE 5 MG: 5 TABLET ORAL at 01:01

## 2022-06-07 RX ADMIN — ACETAMINOPHEN 1000 MG: 500 TABLET ORAL at 01:01

## 2022-06-07 RX ADMIN — ACETAMINOPHEN 1000 MG: 500 TABLET ORAL at 12:26

## 2022-06-07 NOTE — PROGRESS NOTES
Case Management Discharge Note      Final Note: Dc home no needs         Selected Continued Care - Discharged on 6/7/2022 Admission date: 6/6/2022 - Discharge disposition: Home or Self Care    Destination    No services have been selected for the patient.              Durable Medical Equipment    No services have been selected for the patient.              Dialysis/Infusion    No services have been selected for the patient.              Home Medical Care    No services have been selected for the patient.              Therapy    No services have been selected for the patient.              Community Resources    No services have been selected for the patient.              Community & DME    No services have been selected for the patient.                       Final Discharge Disposition Code: 01 - home or self-care

## 2022-06-07 NOTE — DISCHARGE INSTRUCTIONS
Ok for regular diet  Do not drive for next 2 weeks  Ok to shower but be aware you are a fall risk so have someone with you or place a chair in the shower. It is ok to wet the breast. Wash the drain site with water and liquid soap everyday. No sponge or cloths. Wash the drain site before other parts of the body  Strip drains q 8 hours and record drain output daily. Wash hands or wear gloves when manipulating drains.  Do not exercise for the next 2 weeks.  Sleep in an upright position  No bra for 1 week. After that, wear a comfortable soft bra  Ok to move arms slowly to the shoulder level (brushing hair movement)

## 2022-06-07 NOTE — OUTREACH NOTE
Prep Survey    Flowsheet Row Responses   [facility] facility patient discharged from? [facility]   Is LACE score < 7 ? Yes   Emergency Room discharge w/ pulse ox? No   Eligibility [facility]   Date of Admission 06/06/22   Date of Discharge 06/07/22   Discharge Disposition Home or Self Care   Discharge diagnosis MASTECTOMY W/ SENTINEL NODE BIOPSY   Does the patient have one of the following disease processes/diagnoses(primary or secondary)? General Surgery   Does the patient have Home health ordered? No   Is there a DME ordered? No   Prep survey completed? Yes          ARTHUR PEACOCK - Registered Nurse

## 2022-06-07 NOTE — PLAN OF CARE
Goal Outcome Evaluation:  Plan of Care Reviewed With: patient           Outcome Evaluation: VSS, saline locked, pain controlled w/ scheduled meds and oxycodone, no nausea, voiding, ambulated in halls, bilateral incisions CDI and approximated, JAJA x1 w/ serosanguineous output, on room air, SCDs worn, tolerating regular diet, L breast was cooler at beginning of shift but warmed up as shift progressed

## 2022-06-07 NOTE — OUTREACH NOTE
Prep Survey    Flowsheet Row Responses   [facility] [place] [facility] from? [facility]   Is LACE score < 7 ? Yes   Emergency Room discharge w/ pulse ox? No   Eligibility [facility]   Date of Admission 06/06/22   Date of Discharge 06/07/22   Discharge Disposition Home or Self Care   Discharge diagnosis LEFT BREAST SKIN SPARING MASTECTOMY WITH LEFT AXILLARY SENTINEL NODE BIOPSY (Left   Does the patient have one of the following disease processes/diagnoses(primary or secondary)? General Surgery   Does the patient have Home health ordered? No   Is there a DME ordered? No   Prep survey completed? Yes          HARJEET FLORES - Registered Nurse

## 2022-06-07 NOTE — PROGRESS NOTES
General Surgery     POD1 s/p L skin sparing mastectomy, SLN biopsy.     Did well overnight. Pain controlled. Has ambulated. No complaints.     Sitting up in bed   No distress  Dressings clean and dry    Plan:   -Home today  -Will call with pathology report  -Follow up 2-4 weeks    Eloise Mendoza MD

## 2022-06-07 NOTE — PROGRESS NOTES
PLASTIC SURGERY PROGRESS NOTE    Patient Identification:  Name: Kaylee Little    Age: 56 y.o.    Sex: female   :  1965  MRN: [id number]               Subjective:  No acute events.    Objective:    Continuous Infusions:lactated ringers, 9 mL/hr, Last Rate: 9 mL/hr (22 1228)        Scheduled Meds:acetaminophen, 1,000 mg, Oral, Q8H  gabapentin, 100 mg, Oral, Q8H  ibuprofen, 800 mg, Oral, Q8H  sodium chloride, 10 mL, Intravenous, Q12H      PRN Meds:HYDROmorphone  •  ondansetron  •  oxyCODONE  •  sodium chloride    Vital signs in last 24 hours:  Vitals:    22 1842 22 2108 22 0059 22 0520   BP: 100/63 95/61 90/55 93/58   BP Location: Right arm Right arm Right arm Right arm   Patient Position: Lying Lying Lying Lying   Pulse: 106 107 101 92   Resp: 16 16 16 16   Temp: 98.3 °F (36.8 °C) 98 °F (36.7 °C) 97.5 °F (36.4 °C) 97.3 °F (36.3 °C)   TempSrc: Oral Oral Oral Oral   SpO2: 94% 94% 93% 94%   Weight:       Height:           Intake/Output:I/O last 3 completed shifts:  In: 1300 [I.V.:1300]  Out: 440 [Urine:325; Drains:115]    Exam:  GEN: no acute distress, resting quietly   HEENT: NCAT, EOMI, MMM   HEART: normal rate, regular rhythm   LUNGS: respirations non-labored   ABD: soft, nondistended, nontender   EXT: moves all extremities, no edema      No results found for this or any previous visit (from the past 24 hour(s)).      Assessment:    Ductal carcinoma in situ (DCIS) of left breast      1 Day Post-Op Procedure(s) (LRB):  LEFT BREAST SKIN SPARING MASTECTOMY WITH LEFT AXILLARY SENTINEL NODE BIOPSY (Left)  Left breast immediate reconstruction with implant and mesh, possible expander, right breast  Reduction and use of spy (Left)    Plan:  Home today  Marjorie Garcia MD    2022

## 2022-06-07 NOTE — DISCHARGE SUMMARY
Physician Discharge Summary  Patient Identification:  Name: Kaylee Little  Age: 56 y.o.  Sex: female  :  1965  MRN: [id number]    Admit date: 2022    Discharge date and time: 2022    Admitting Physician: Eloise Mendoza MD     Discharge Physician: Eloise Mendoza MD    Admission Diagnoses: Ductal carcinoma in situ (DCIS) of left breast [D05.12]  Ductal carcinoma in situ (DCIS) of left breast [D05.12]    Discharge Diagnoses: Ductal carcinoma in situ (DCIS) of left breast [D05.12]  Ductal carcinoma in situ (DCIS) of left breast [D05.12]    Discharged Condition: good    Admission HPI:    Procedures:  Procedure(s) (LRB):  LEFT BREAST SKIN SPARING MASTECTOMY WITH LEFT AXILLARY SENTINEL NODE BIOPSY (Left)  Left breast immediate reconstruction with implant and mesh, possible expander, right breast  Reduction and use of spy (Left)    Hospital Course:   The pt was brought to the OR on 2022 for the above procedure. Patient  tolerated the procedure well, see dictated operative note for detailed summary. At the time of discharge patient was tolerating a regular diet and having bowel movements. Also, at the time of discharge  pain was controlled on oral medication and patient was ambulating without assistance.     Consults:   None    Significant Diagnostic Studies: labs and radiology    Discharge Exam:  GEN: no acute distress, resting quietly   HEENT: NCAT, EOMI, MMM   HEART: normal rate, regular rhythm   LUNGS: respirations non-labored   ABD: soft, nondistended, nontender   EXT: moves all extremities, no edema    Disposition:  Home    Patient Instructions:   @[facility]@   Follow-up Information     Carly Worley MD .    Specialties: Internal Medicine, Pediatrics  Contact information:  [address] [place] [address]  [phone]                             Ductal carcinoma in situ (DCIS) of left breast      Signed:  Marjorie Ewing MD, PhD  Plastic and  reconstructive surgery   6/7/2022

## 2022-06-08 ENCOUNTER — TRANSITIONAL CARE MANAGEMENT TELEPHONE ENCOUNTER (OUTPATIENT)
Dept: CALL CENTER | Facility: HOSPITAL | Age: 57
End: 2022-06-08

## 2022-06-08 NOTE — OUTREACH NOTE
Call Center TCM Note    Flowsheet Row Responses   [facility] patient discharged from? [place]   Does the patient have one of the following disease processes/diagnoses(primary or secondary)? General Surgery   TCM attempt successful? No   Unsuccessful attempts Attempt 1          Marisabel Grant MA    6/8/2022, 16:16 EDT

## 2022-06-08 NOTE — OUTREACH NOTE
Call Center TCM Note    Flowsheet Row Responses   [facility] [place] patient discharged from? [place]   Does the patient have one of the following disease processes/diagnoses(primary or secondary)? General Surgery   TCM attempt successful? Yes   Discharge diagnosis MASTECTOMY W/ SENTINEL NODE BIOPSY   Meds reviewed with patient/caregiver? Yes   Is the patient having any side effects they believe may be caused by any medication additions or changes? No   Does the patient have all medications related to this admission filled (includes all antibiotics, pain medications, etc.) Yes   Is the patient taking all medications as directed (includes completed medication regime)? Yes   Does the patient have a follow up appointment scheduled with their surgeon? Yes   Has the patient kept scheduled appointments due by today? N/A   Has home health visited the patient within 72 hours of discharge? N/A   Psychosocial issues? No   Did the patient receive a copy of their discharge instructions? Yes   Nursing interventions Reviewed instructions with patient   What is the patient's perception of their health status since discharge? Improving   Nursing interventions Nurse provided patient education   Is the patient /caregiver able to teach back basic post-op care? Continue use of incentive spirometry at least 1 week post discharge, Take showers only when approved by MD-sponge bathe until then, Do not remove steri-strips, Lifting as instructed by MD in discharge instructions, No tub bath, swimming, or hot tub until instructed by MD, Practice 'cough and deep breath', Drive as instructed by MD in discharge instructions   Is the patient/caregiver able to teach back signs and symptoms of incisional infection? Increased redness, swelling or pain at the incisonal site, Pus or odor from incision, Fever, Increased drainage or bleeding, Incisional warmth   Is the patient/caregiver able to teach back steps to recovery at home? Set small, achievable  goals for return to baseline health, Rest and rebuild strength, gradually increase activity, Weigh daily, Practice good oral hygiene, Eat a well-balance diet, Make a list of questions for surgeon's appointment   If the patient is a current smoker, are they able to teach back resources for cessation? Not a smoker   Is the patient/caregiver able to teach back the hierarchy of who to call/visit for symptoms/problems? PCP, Specialist, Home health nurse, Urgent Care, ED, 911 Yes   TCM call completed? Yes   Wrap up additional comments Pt pretty sore but doing ok s/p lt mastectomy and rt breast reduction. Pecrocet in place prn. Drainage has slowed way down. No fever, chills, redness, heat or edema. No quesitons at this time. Pt willl see plastic srgn on 06/13, first POST OP is 06/20 with surgeon. Pt will keep sched appt with PCP in 11/2022.          Marisabel Grant MA    6/8/2022, 16:46 EDT

## 2022-06-09 LAB
LAB AP CASE REPORT: NORMAL
LAB AP DIAGNOSIS COMMENT: NORMAL
LAB AP SPECIAL STAINS: NORMAL
LAB AP SYNOPTIC CHECKLIST: NORMAL
PATH REPORT.FINAL DX SPEC: NORMAL
PATH REPORT.GROSS SPEC: NORMAL

## 2022-06-09 NOTE — PROGRESS NOTES
"Post-op Follow-up (1wk post op)            History of Present Illness  Kaylee Little is a 56 y.o. female who presents to [facility]ON[facility]GERY as a post op follow up      Left breast immediate reconstruction with implant and mesh, possible expander, right breast  Reduction and use of spy    She is 1wk post op and states she is having discomfort around her drain, lower left breast. Her drain has been putting out 180/120/75 for the past 3 days and she has 25 already this am. She does have questions about if she has to have radiation & how that will affect the implant.     Patient has no known allergies.  Allergies Reconciled.    Review of Systems   All review of system has been reviewed and it  is negative except the ones note above.     Objective     /84 (BP Location: Right arm)   Pulse 95   Temp 98.2 °F (36.8 °C) (Temporal)   Ht 175.3 cm (69\")   Wt 95.9 kg (211 lb 8 oz)   SpO2 96%   BMI 31.23 kg/m²     Body mass index is 31.23 kg/m².    Physical Exam  Incisions are clean, dry and intact.     Result Review :   The following data was reviewed by: Marjorie Garcia MD on 06/13/2022:           Assessment and Plan      Diagnoses and all orders for this visit:    1. Ductal carcinoma in situ (DCIS) of left breast (Primary)    2. Postoperative follow-up        Additional Order(s):       Plan:  Patient to call when drain output is less than 30 cc each day for two consecutive days.   I spent 15 minutes caring for Kaylee on this date of service. This time includes time spent by me in the following activities:performing a medically appropriate examination and/or evaluation , counseling and educating the patient/family/caregiver, documenting information in the medical record, and care coordination    Follow Up     No follow-ups on file.    Patient was given instructions and counseling regarding her condition. Please see specific information pulled into the AVS if " appropriate.     Marjorie Garcia MD  06/13/2022

## 2022-06-10 ENCOUNTER — TELEPHONE (OUTPATIENT)
Dept: SURGERY | Facility: CLINIC | Age: 57
End: 2022-06-10

## 2022-06-10 DIAGNOSIS — D05.10 DUCTAL CARCINOMA IN SITU (DCIS) OF BREAST, UNSPECIFIED LATERALITY: Primary | ICD-10-CM

## 2022-06-10 NOTE — TELEPHONE ENCOUNTER
Called Kaylee Little regarding recent surgical pathology.    Left breast DCIS, (60mm), grade III, margins within 1mm of anterior margin, ER+/CA-, Ki-67 10%. 0/4 lymph nodes.   [id number]    Referral placed for medical oncology   Will discuss close margin at tumor board.  Follow up in two weeks for wound check and further cancer surveillance planning.     Eloise Mendoza MD

## 2022-06-13 ENCOUNTER — OFFICE VISIT (OUTPATIENT)
Dept: PLASTIC SURGERY | Facility: CLINIC | Age: 57
End: 2022-06-13

## 2022-06-13 VITALS
SYSTOLIC BLOOD PRESSURE: 116 MMHG | TEMPERATURE: 98.2 F | BODY MASS INDEX: 31.32 KG/M2 | OXYGEN SATURATION: 96 % | HEIGHT: 69 IN | HEART RATE: 95 BPM | DIASTOLIC BLOOD PRESSURE: 84 MMHG | WEIGHT: 211.5 LBS

## 2022-06-13 DIAGNOSIS — D05.12 DUCTAL CARCINOMA IN SITU (DCIS) OF LEFT BREAST: Primary | ICD-10-CM

## 2022-06-13 DIAGNOSIS — Z09 POSTOPERATIVE FOLLOW-UP: ICD-10-CM

## 2022-06-13 PROCEDURE — 99024 POSTOP FOLLOW-UP VISIT: CPT | Performed by: SURGERY

## 2022-06-21 ENCOUNTER — NURSE NAVIGATOR (OUTPATIENT)
Dept: OTHER | Facility: HOSPITAL | Age: 57
End: 2022-06-21

## 2022-06-21 NOTE — PROGRESS NOTES
Called Ms. Little to see how she was doing. She stated she still has her drains in place and is taking pain meds at night only. She will follow up with Dr. Mendoza tomorrow for a post op visit and ask about the drains. She also has questions about the need for radiation she will discuss with Dr. Mendoza tomorrow. Otherwise she is doing well and has no needs at this time. She was thankful for the call and will reach out if any questions or needs arise.

## 2022-06-22 ENCOUNTER — OFFICE VISIT (OUTPATIENT)
Dept: SURGERY | Facility: CLINIC | Age: 57
End: 2022-06-22

## 2022-06-22 VITALS — HEIGHT: 69 IN | BODY MASS INDEX: 32.41 KG/M2 | WEIGHT: 218.8 LBS

## 2022-06-22 DIAGNOSIS — D05.12 DUCTAL CARCINOMA IN SITU (DCIS) OF LEFT BREAST: Primary | ICD-10-CM

## 2022-06-22 PROCEDURE — 99024 POSTOP FOLLOW-UP VISIT: CPT | Performed by: STUDENT IN AN ORGANIZED HEALTH CARE EDUCATION/TRAINING PROGRAM

## 2022-06-23 ENCOUNTER — MDT ASSESSMENT (OUTPATIENT)
Dept: OTHER | Facility: HOSPITAL | Age: 57
End: 2022-06-23

## 2022-06-27 ENCOUNTER — TELEPHONE (OUTPATIENT)
Dept: SURGERY | Facility: CLINIC | Age: 57
End: 2022-06-27

## 2022-06-27 DIAGNOSIS — D05.10 DUCTAL CARCINOMA IN SITU (DCIS) OF BREAST, UNSPECIFIED LATERALITY: Primary | ICD-10-CM

## 2022-06-27 NOTE — TELEPHONE ENCOUNTER
Called regarding multi-disciplinary conference discussion of close margin from mastectomy. Plan for radiation oncology eval. She is agreeable. Orders placed.     Eloise Mendoza MD

## 2022-06-30 ENCOUNTER — TELEPHONE (OUTPATIENT)
Dept: SURGERY | Facility: CLINIC | Age: 57
End: 2022-06-30

## 2022-06-30 NOTE — TELEPHONE ENCOUNTER
Hub staff attempted to follow warm transfer process and was unsuccessful     Caller: Kaylee Little    Relationship to patient: Self    Best call back number: [phone]  Patient is needing: DRAIN REMOVAL

## 2022-06-30 NOTE — TELEPHONE ENCOUNTER
Spoke with patient-she states Dr. Mendoza advised that she can come into the office the day after her drain reaches 30 cc two days in a row. Pt states her total Tuesday was 35 and that yesterday she had 30 cc. So far today she has only had 10 cc and feels as though it will stay under 30 cc. Scheduled for patient for drain removal tomorrow afternoon as long as her total stays below 30 today. Advised to call and let us know if it increases.

## 2022-07-01 ENCOUNTER — CLINICAL SUPPORT (OUTPATIENT)
Dept: SURGERY | Facility: CLINIC | Age: 57
End: 2022-07-01

## 2022-07-08 NOTE — PROGRESS NOTES
"Post-op Follow-up (1 month post op)            History of Present Illness  Kaylee Little is a 57 y.o. female who presents to [facility] RECON[facility]GERY as a post op follow up Left breast immediate reconstruction with implant and mesh, possible expander, right breast  Reduction and use of spy 6/6/22.    Here today for 1 month post op. Denies pain. Happy with results. Does have steri strip intact on left breast. Doing well. Has to have radiation and sees radiation oncology tomorrow and also oncologist.   We drained around 55 cc of fluid from left breast 07/14/22    Patient has no known allergies.  Allergies Reconciled.    Review of Systems   All review of system has been reviewed and it  is negative except the ones note above.     Objective     /56   Pulse 87   Temp 98.6 °F (37 °C) (Temporal)   Ht 175.3 cm (69.02\")   SpO2 97%   BMI 32.30 kg/m²     Body mass index is 32.3 kg/m².    Physical Exam  Incisions are clean, dry and intact. Left breast with seroma and still high    Result Review :   The following data was reviewed by: Marjorie Garcia MD on 06/13/2022:           Assessment and Plan      Diagnoses and all orders for this visit:    1. Ductal carcinoma in situ (DCIS) of left breast (Primary)    2. Postoperative follow-up        Additional Order(s):       Plan:  I aspirated 50 cc of serous fluid from the left breast  I would prefer patient to wait two more months before radiation since the implant hasn't dropped completely yet. It radiation is needed now, it is ok to radiate and I will deal with asymmetries at a later day.     I spent 15 minutes caring for Kaylee on this date of service. This time includes time spent by me in the following activities:performing a medically appropriate examination and/or evaluation , counseling and educating the patient/family/caregiver, documenting information in the medical record, and care coordination    Follow Up     No " follow-ups on file.    Patient was given instructions and counseling regarding her condition. Please see specific information pulled into the AVS if appropriate.     Marjorie Garcia MD  07/14/2022

## 2022-07-14 ENCOUNTER — OFFICE VISIT (OUTPATIENT)
Dept: PLASTIC SURGERY | Facility: CLINIC | Age: 57
End: 2022-07-14

## 2022-07-14 VITALS
SYSTOLIC BLOOD PRESSURE: 120 MMHG | HEIGHT: 69 IN | TEMPERATURE: 98.6 F | DIASTOLIC BLOOD PRESSURE: 56 MMHG | BODY MASS INDEX: 32.3 KG/M2 | HEART RATE: 87 BPM | OXYGEN SATURATION: 97 %

## 2022-07-14 DIAGNOSIS — Z09 POSTOPERATIVE FOLLOW-UP: ICD-10-CM

## 2022-07-14 DIAGNOSIS — D05.12 DUCTAL CARCINOMA IN SITU (DCIS) OF LEFT BREAST: Primary | ICD-10-CM

## 2022-07-14 PROCEDURE — 99024 POSTOP FOLLOW-UP VISIT: CPT | Performed by: SURGERY

## 2022-07-15 ENCOUNTER — CONSULT (OUTPATIENT)
Dept: ONCOLOGY | Facility: CLINIC | Age: 57
End: 2022-07-15

## 2022-07-15 ENCOUNTER — APPOINTMENT (OUTPATIENT)
Dept: RADIATION ONCOLOGY | Facility: HOSPITAL | Age: 57
End: 2022-07-15

## 2022-07-15 ENCOUNTER — LAB (OUTPATIENT)
Dept: LAB | Facility: HOSPITAL | Age: 57
End: 2022-07-15

## 2022-07-15 ENCOUNTER — CONSULT (OUTPATIENT)
Dept: RADIATION ONCOLOGY | Facility: HOSPITAL | Age: 57
End: 2022-07-15

## 2022-07-15 VITALS
RESPIRATION RATE: 18 BRPM | TEMPERATURE: 98 F | WEIGHT: 215.7 LBS | DIASTOLIC BLOOD PRESSURE: 78 MMHG | OXYGEN SATURATION: 97 % | BODY MASS INDEX: 31.95 KG/M2 | SYSTOLIC BLOOD PRESSURE: 112 MMHG | HEIGHT: 69 IN | HEART RATE: 93 BPM

## 2022-07-15 VITALS
SYSTOLIC BLOOD PRESSURE: 115 MMHG | WEIGHT: 215.6 LBS | BODY MASS INDEX: 31.82 KG/M2 | DIASTOLIC BLOOD PRESSURE: 80 MMHG | HEART RATE: 94 BPM | OXYGEN SATURATION: 97 %

## 2022-07-15 DIAGNOSIS — C50.919 MALIGNANT NEOPLASM OF FEMALE BREAST, UNSPECIFIED ESTROGEN RECEPTOR STATUS, UNSPECIFIED LATERALITY, UNSPECIFIED SITE OF BREAST: Primary | ICD-10-CM

## 2022-07-15 DIAGNOSIS — D05.12 DUCTAL CARCINOMA IN SITU (DCIS) OF LEFT BREAST: Primary | ICD-10-CM

## 2022-07-15 DIAGNOSIS — D05.12 DUCTAL CARCINOMA IN SITU (DCIS) OF LEFT BREAST: ICD-10-CM

## 2022-07-15 LAB
ALBUMIN SERPL-MCNC: 4.7 G/DL (ref 3.5–5.2)
ALBUMIN/GLOB SERPL: 1.7 G/DL (ref 1.1–2.4)
ALP SERPL-CCNC: 114 U/L (ref 38–116)
ALT SERPL W P-5'-P-CCNC: 52 U/L (ref 0–33)
ANION GAP SERPL CALCULATED.3IONS-SCNC: 13.6 MMOL/L (ref 5–15)
AST SERPL-CCNC: 43 U/L (ref 0–32)
BASOPHILS # BLD AUTO: 0.04 10*3/MM3 (ref 0–0.2)
BASOPHILS NFR BLD AUTO: 0.6 % (ref 0–1.5)
BILIRUB SERPL-MCNC: 0.4 MG/DL (ref 0.2–1.2)
BUN SERPL-MCNC: 10 MG/DL (ref 6–20)
BUN/CREAT SERPL: 16.1 (ref 7.3–30)
CALCIUM SPEC-SCNC: 9.7 MG/DL (ref 8.5–10.2)
CHLORIDE SERPL-SCNC: 103 MMOL/L (ref 98–107)
CO2 SERPL-SCNC: 24.4 MMOL/L (ref 22–29)
CREAT SERPL-MCNC: 0.62 MG/DL (ref 0.6–1.1)
DEPRECATED RDW RBC AUTO: 39.3 FL (ref 37–54)
EGFRCR SERPLBLD CKD-EPI 2021: 103.4 ML/MIN/1.73
EOSINOPHIL # BLD AUTO: 0.3 10*3/MM3 (ref 0–0.4)
EOSINOPHIL NFR BLD AUTO: 4.2 % (ref 0.3–6.2)
ERYTHROCYTE [DISTWIDTH] IN BLOOD BY AUTOMATED COUNT: 11.9 % (ref 12.3–15.4)
GLOBULIN UR ELPH-MCNC: 2.7 GM/DL (ref 1.8–3.5)
GLUCOSE SERPL-MCNC: 108 MG/DL (ref 74–124)
HCT VFR BLD AUTO: 43.4 % (ref 34–46.6)
HGB BLD-MCNC: 14.2 G/DL (ref 12–15.9)
IMM GRANULOCYTES # BLD AUTO: 0.02 10*3/MM3 (ref 0–0.05)
IMM GRANULOCYTES NFR BLD AUTO: 0.3 % (ref 0–0.5)
LYMPHOCYTES # BLD AUTO: 2.07 10*3/MM3 (ref 0.7–3.1)
LYMPHOCYTES NFR BLD AUTO: 28.7 % (ref 19.6–45.3)
MCH RBC QN AUTO: 29.3 PG (ref 26.6–33)
MCHC RBC AUTO-ENTMCNC: 32.7 G/DL (ref 31.5–35.7)
MCV RBC AUTO: 89.7 FL (ref 79–97)
MONOCYTES # BLD AUTO: 0.6 10*3/MM3 (ref 0.1–0.9)
MONOCYTES NFR BLD AUTO: 8.3 % (ref 5–12)
NEUTROPHILS NFR BLD AUTO: 4.19 10*3/MM3 (ref 1.7–7)
NEUTROPHILS NFR BLD AUTO: 57.9 % (ref 42.7–76)
NRBC BLD AUTO-RTO: 0 /100 WBC (ref 0–0.2)
PLATELET # BLD AUTO: 292 10*3/MM3 (ref 140–450)
PMV BLD AUTO: 9.5 FL (ref 6–12)
POTASSIUM SERPL-SCNC: 3.9 MMOL/L (ref 3.5–4.7)
PROT SERPL-MCNC: 7.4 G/DL (ref 6.3–8)
RBC # BLD AUTO: 4.84 10*6/MM3 (ref 3.77–5.28)
SODIUM SERPL-SCNC: 141 MMOL/L (ref 134–145)
WBC NRBC COR # BLD: 7.22 10*3/MM3 (ref 3.4–10.8)

## 2022-07-15 PROCEDURE — 36415 COLL VENOUS BLD VENIPUNCTURE: CPT

## 2022-07-15 PROCEDURE — G0463 HOSPITAL OUTPT CLINIC VISIT: HCPCS | Performed by: RADIOLOGY

## 2022-07-15 PROCEDURE — 85025 COMPLETE CBC W/AUTO DIFF WBC: CPT

## 2022-07-15 PROCEDURE — 99205 OFFICE O/P NEW HI 60 MIN: CPT | Performed by: RADIOLOGY

## 2022-07-15 PROCEDURE — 99205 OFFICE O/P NEW HI 60 MIN: CPT | Performed by: INTERNAL MEDICINE

## 2022-07-15 PROCEDURE — 80053 COMPREHEN METABOLIC PANEL: CPT

## 2022-07-15 RX ORDER — TAMOXIFEN CITRATE 20 MG/1
20 TABLET ORAL 2 TIMES DAILY
Qty: 30 TABLET | Refills: 3 | Status: SHIPPED | OUTPATIENT
Start: 2022-07-15 | End: 2022-08-08

## 2022-07-15 NOTE — PROGRESS NOTES
Subjective   Kaylee Little is a 57 y.o. female.  Referred by Dr. 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    3/14/2022-bilateral screening mammogram  Indeterminate left breast calcifications.  Left breast diagnostic mammogram recommended.  No suspicious abnormalities of the right breast.    3/25/2022-left breast diagnostic mammogram  Suspicious left breast microcalcifications.  Stereotactic biopsy recommended.    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  ER +100%  AK negative  Ki-67 10%    4/20/2022-bilateral breast MRI  Nonspecific enhancement is seen surrounding the biopsy cavity.  Linear branching enhancement in the middle third medial aspect of the left breast at 9:00.  No mammographic correlate appreciated.  If breast conservation desired an MRI guided biopsy recommended.  Linear borderline clumped enhancement that measures to the order of 3.5 cm in the middle third upper outer quadrant of the left breast 2 o'clock position.  MRI guided biopsy recommended if breast conservation desired.  No suspicious findings for malignancy in the right breast.    6/6/2021-left breast mastectomy  Pathology consistent with ductal carcinoma in situ intermediate to high-grade  DCIS measures 16 mm  ER +100%  AK negative  4 sentinel lymph nodes negative.    Ms. Little presents today to discuss adjuvant therapy.  She is recovering well from surgery.    The following portions of the patient's history were reviewed and updated as appropriate: allergies, current medications, past family history, past medical history, past social history, past surgical history and problem list.    Past Medical History:   Diagnosis Date   • AK (actinic keratosis)    • Ankle sprain    •  Arthralgia    • Arthritis     HANDS   • Breast cancer ([facility]) April 6, 2022   • Conjunctivitis    • Contact dermatitis, allergic    • Corneal abrasion    • Dry mouth    • Fatigue    • Rosacea    • Seborrheic keratosis         Past Surgical History:   Procedure Laterality Date   • BREAST BIOPSY Left 4/5/2022   • BREAST RECONSTRUCTION Left 06/06/2022    Procedure: Left breast immediate reconstruction with implant and mesh, possible expander, right breast  Reduction and use of spy;  Surgeon: Marjorie Garcia MD;  Location: [facility];  Service: Plastics;  Laterality: Left;   • COLONOSCOPY  2015   • D & C HYSTEROSCOPY  2018   • ENDOMETRIAL BIOPSY     • MASTECTOMY W/ SENTINEL NODE BIOPSY Left 06/06/2022    Procedure: LEFT BREAST SKIN SPARING MASTECTOMY WITH LEFT AXILLARY SENTINEL NODE BIOPSY;  Surgeon: Eloise Mendoza MD;  Location: [facility];  Service: General;  Laterality: Left;   • REDUCTION MAMMAPLASTY  6/6/22    Right side to match reconstruction on the left        Family History   Problem Relation Age of Onset   • Rheum arthritis Mother    • Arthritis Mother    • Diabetes Mother    • Hypertension Mother    • Cancer Father         Prostate   • COPD Father    • Arthritis Sister    • Lymphoma Sister    • Diabetes Sister    • Miscarriages / Stillbirths Sister    • Cancer Sister         Lymphoma   • Diabetes Brother    • Lung cancer Other         MEN ON DAD'S SIDE   • Other Other         BRAIN TUMOR;MEN ON DAD'S SIDE   • Malig Hyperthermia Neg Hx         Social History     Socioeconomic History   • Marital status:    Tobacco Use   • Smoking status: Never Smoker   • Smokeless tobacco: Never Used   Vaping Use   • Vaping Use: Never used   Substance and Sexual Activity   • Alcohol use: Yes     Alcohol/week: 2.0 standard drinks     Types: 1 Glasses of wine, 1 Cans of beer per week     Comment: Occasionally   • Drug use: Never     Comment: NO   • Sexual activity: Yes     Partners: Male     Birth  "control/protection: Post-menopausal        OB History    No obstetric history on file.          No Known Allergies         Review of Systems   Constitutional: Negative.    HENT: Negative.    Eyes: Negative.    Respiratory: Negative.    Cardiovascular: Negative.    Gastrointestinal: Negative.    Endocrine: Negative.    Genitourinary: Negative.  Positive for amenorrhea.   Musculoskeletal: Negative.    Neurological: Negative.    Hematological: Negative.          Objective   Blood pressure 112/78, pulse 93, temperature 98 °F (36.7 °C), temperature source Temporal, resp. rate 18, height 175.3 cm (69.02\"), weight 97.8 kg (215 lb 11.2 oz), SpO2 97 %.   Physical Exam  Vitals reviewed.   Constitutional:       Appearance: Normal appearance. She is obese.   HENT:      Head: Normocephalic and atraumatic.      Nose: Nose normal.   Eyes:      Conjunctiva/sclera: Conjunctivae normal.   Cardiovascular:      Rate and Rhythm: Normal rate.   Pulmonary:      Effort: Pulmonary effort is normal.   Musculoskeletal:         General: Normal range of motion.      Cervical back: Normal range of motion.   Skin:     General: Skin is warm.   Neurological:      General: No focal deficit present.      Mental Status: She is alert and oriented to person, place, and time.   Psychiatric:         Mood and Affect: Mood normal.         Behavior: Behavior normal.         Thought Content: Thought content normal.         Judgment: Judgment normal.       Breast Exam: Status post left breast mastectomy with reconstruction.  Right breast appears normal on inspection.    Lab on 07/15/2022   Component Date Value Ref Range Status   • WBC 07/15/2022 7.22  3.40 - 10.80 10*3/mm3 Final   • RBC 07/15/2022 4.84  3.77 - 5.28 10*6/mm3 Final   • Hemoglobin 07/15/2022 14.2  12.0 - 15.9 g/dL Final   • Hematocrit 07/15/2022 43.4  34.0 - 46.6 % Final   • MCV 07/15/2022 89.7  79.0 - 97.0 fL Final   • MCH 07/15/2022 29.3  26.6 - 33.0 pg Final   • MCHC 07/15/2022 32.7  31.5 - " 35.7 g/dL Final   • RDW 07/15/2022 11.9 (A) 12.3 - 15.4 % Final   • RDW-SD 07/15/2022 39.3  37.0 - 54.0 fl Final   • MPV 07/15/2022 9.5  6.0 - 12.0 fL Final   • Platelets 07/15/2022 292  140 - 450 10*3/mm3 Final   • Neutrophil % 07/15/2022 57.9  42.7 - 76.0 % Final   • Lymphocyte % 07/15/2022 28.7  19.6 - 45.3 % Final   • Monocyte % 07/15/2022 8.3  5.0 - 12.0 % Final   • Eosinophil % 07/15/2022 4.2  0.3 - 6.2 % Final   • Basophil % 07/15/2022 0.6  0.0 - 1.5 % Final   • Immature Grans % 07/15/2022 0.3  0.0 - 0.5 % Final   • Neutrophils, Absolute 07/15/2022 4.19  1.70 - 7.00 10*3/mm3 Final   • Lymphocytes, Absolute 07/15/2022 2.07  0.70 - 3.10 10*3/mm3 Final   • Monocytes, Absolute 07/15/2022 0.60  0.10 - 0.90 10*3/mm3 Final   • Eosinophils, Absolute 07/15/2022 0.30  0.00 - 0.40 10*3/mm3 Final   • Basophils, Absolute 07/15/2022 0.04  0.00 - 0.20 10*3/mm3 Final   • Immature Grans, Absolute 07/15/2022 0.02  0.00 - 0.05 10*3/mm3 Final   • nRBC 07/15/2022 0.0  0.0 - 0.2 /100 WBC Final        No radiology results for the last 30 days.       Assessment & Plan       *Left breast ductal carcinoma in situ  · ER positive, TN negative  · Intermediate to high-grade  · Measures 16 mm  · Close margins of 1 mm requiring adjuvant radiation.  Case presented in multidisciplinary conference and adjuvant radiation recommended.  · Discussed the pathology and explained to her with DCIS there is no risk of distant metastasis.  · Given that she had mastectomy the risk of recurrence of DCIS or invasive cancer in the left breast is very low.  · However she would benefit from adjuvant endocrine therapy to decrease the risk of DCIS/invasive cancer in the contralateral breast.  · We discussed the options of anastrozole and tamoxifen.  · Given that she is less than 60 there is a benefit of anastrozole or tamoxifen however she does have significant osteopenia with a T score of -2.3.  · Due to this reason I think it would be reasonable to proceed  with tamoxifen 20 mg daily.  · Last DEXA scan was in November 2020.  · Adverse effects of tamoxifen including but not limited to hot flashes, mood changes, fatigue, risk of DVT PE, risk of cataracts, risk of uterine cancer discussed.    *Osteopenia  · DEXA scan from November 2020 reviewed and T score noted to be -2.3 at the femoral neck.  · Repeat DEXA due November 2022.  · If there is improvement in bone density then we could consider switching to anastrozole    *Close margins  · Due to the anterior margin being less than 1 mm she has been recommended to undergo adjuvant radiation.  · She has had reconstructive surgery and she has been recommended to wait about 2 to 3 months prior to starting radiation    *Follow-up-6 months    60 minutes spent on the encounter including reviewing the medical records, face-to-face time, documentation on the same day.

## 2022-07-25 ENCOUNTER — NURSE NAVIGATOR (OUTPATIENT)
Dept: OTHER | Facility: HOSPITAL | Age: 57
End: 2022-07-25

## 2022-07-25 NOTE — PROGRESS NOTES
Called Ms. Ireland to see how she was doing. She stated she is doing well and is thankful she does not need radiation therapy. She has started on the hormone blocking therapy and is doing well. We discussed our survivorship program and she was interested in getting a virtual appointment. That appointment has been made for Aug 15th. Otherwise, she is doing well and has no needs. She was thankful for the call and will reach out if any questions or needs arise.

## 2022-08-08 RX ORDER — TAMOXIFEN CITRATE 20 MG/1
20 TABLET ORAL DAILY
Qty: 90 TABLET | Refills: 3 | Status: SHIPPED | OUTPATIENT
Start: 2022-08-08

## 2022-08-15 ENCOUNTER — OFFICE VISIT (OUTPATIENT)
Dept: OTHER | Facility: HOSPITAL | Age: 57
End: 2022-08-15

## 2022-08-15 VITALS — RESPIRATION RATE: 18 BRPM

## 2022-08-15 DIAGNOSIS — D05.12 DUCTAL CARCINOMA IN SITU (DCIS) OF LEFT BREAST: Primary | ICD-10-CM

## 2022-08-15 PROCEDURE — 99215 OFFICE O/P EST HI 40 MIN: CPT | Performed by: NURSE PRACTITIONER

## 2022-08-15 NOTE — PROGRESS NOTES
[facility] MULTIDISCIPLINARY CLINIC  SURVIVORSHIP VISIT: TELEHEALTH  Survivorship Treatment Summary Initial Visit        Kaylee Little is a pleasant 57 y.o. female being followed by Radha Olivo MD for Left breast DCIS. Reviewed today by VIDEO, for initial survivorship treatment summary visit.     HPI  Araseli 57-year-old patient who is status post left breast mastectomy with immediate reconstruction June 6, 2022.  Ultimately after much review it was decided the patient would not derive additional benefit from adjuvant radiation.  She has been taking tamoxifen daily since July 15, 2022.  She is tolerating extremely well.  She denies any hot flashes or mood changes.  She has full range of motion of her upper extremities, does note some mild pulling in the left axilla.  Energy is good, appetite is good, no changes in bowel or bladder.      TREATMENT HISTORY:     Oncology/Hematology History   Ductal carcinoma in situ (DCIS) of left breast   10/29/2020 Imaging    DEXA bone density showed osteopenia (at [facility])     4/5/2022 Initial Diagnosis    Ductal carcinoma in situ (DCIS) of left breast     4/5/2022 Biopsy    Final Diagnosis   Calcifications, left breast, biopsies:    Ductal carcinoma in situ, clinging type, intermediate nuclear grade with central necrosis    No invasive carcinoma is identified    Microcalcifications identified within neoplastic and benign duct spaces    See below for prognostic marker information       Estrogen Receptor Assay (IHC): Positive  Progesterone Receptor Assay (IHC): negative     4/19/2022 Biopsy    Final Diagnosis   1. Breast, Left, Biopsies ([id number]):  Ductal carcinoma in-situ.               A. Ductal carcinoma is of high nuclear grade with cribriform, micropapillary and comedo architecture with       expansive comedonecrosis.  B. Microcalcifications are present in the area of ductal carcinoma in-situ and non-neoplastic tissue.  C. ER positive (100%  strong, Simón 8/8).  D. CT negative (<1%, Simón 2/8).  E. Ki67 10%        6/6/2022 Surgery    Final Diagnosis   1. Left Breast, Oriented Simple Skin Sparing Mastectomy (1321 grams): INTERMEDIATE TO HIGH-GRADE DUCTAL         CARCINOMA IN SITU (DCIS).               A. Tumor site: 12 o'clock.               B. Extent of DCIS: 60 mm (DCIS present in six breast slices from medial to lateral).   C. Architectural patterns: Cribriform, micropapillary and solid.  D. Nuclear grade: Intermediate to high (II-III).  E. Necrosis: Present, central expansive necrosis.   F. Margins: Uninvolved by DCIS.  1. DCIS comes to within 1.0 mm of the closest anterior margin (6.0 mm focus of DCIS), 50 mm from the      posterior margin, 60 mm from the medial margin, 95 mm from the superior margin, 120 mm from the       lateral margin and 130 mm from the inferior margin of resection.  G. Four benign sentinel lymph nodes (0/4).  H. Hormone receptor status: ER positive (100% strong), CT negative (less than 1%), Ki-67 10% (performed on        prior biopsy [id number], slides reviewed).  I. Pathologic stage: pTis, N(sn)0.     2. Pre[place] Lymph Node #2, Left Axilla, Excision:               A. One lymph node, negative for metastatic carcinoma (0/1).     3. Right Breast, Reduction Mammoplasty:               A. Benign unremarkable skin and breast tissue.                 4. Preble Lymph Node #1, Left Axilla, Excision:               A. One lymph node, negative for metastatic carcinoma (0/1).     5. Pre[place] Lymph Node #3, Left Axilla, Excision:               A. Two lymph nodes, negative for metastatic carcinoma (0/2).     Four negative sentinel lymph nodes  Pathologic stage: pTis PN 0     7/15/2022 -  Hormonal Therapy    Tamoxifen         Past Medical History:   Diagnosis Date   • AK (actinic keratosis)    • Ankle sprain    • Arthralgia    • Arthritis     HANDS   • Breast cancer (HCC) April 6, 2022   • Conjunctivitis    • Contact dermatitis, allergic     • Corneal abrasion    • Dry mouth    • Fatigue    • Rosacea    • Seborrheic keratosis        Past Surgical History:   Procedure Laterality Date   • BREAST BIOPSY Left 4/5/2022   • BREAST RECONSTRUCTION Left 06/06/2022    Procedure: Left breast immediate reconstruction with implant and mesh, possible expander, right breast  Reduction and use of spy;  Surgeon: Marjorie Garcia MD;  Location: [facility];  Service: Plastics;  Laterality: Left;   • COLONOSCOPY  2015   • D & C HYSTEROSCOPY  2018   • ENDOMETRIAL BIOPSY     • MASTECTOMY W/ SENTINEL NODE BIOPSY Left 06/06/2022    Procedure: LEFT BREAST SKIN SPARING MASTECTOMY WITH LEFT AXILLARY SENTINEL NODE BIOPSY;  Surgeon: Eloise Mendoza MD;  Location: [facility];  Service: General;  Laterality: Left;   • REDUCTION MAMMAPLASTY  6/6/22    Right side to match reconstruction on the left       Social History     Socioeconomic History   • Marital status:    Tobacco Use   • Smoking status: Never Smoker   • Smokeless tobacco: Never Used   Vaping Use   • Vaping Use: Never used   Substance and Sexual Activity   • Alcohol use: Yes     Alcohol/week: 2.0 standard drinks     Types: 1 Glasses of wine, 1 Cans of beer per week     Comment: Occasionally   • Drug use: Never     Comment: NO   • Sexual activity: Yes     Partners: Male     Birth control/protection: Post-menopausal         No results found for: LDH, URICACID    Lab Results   Component Value Date    GLUCOSE 108 07/15/2022    BUN 10 07/15/2022    CREATININE 0.62 07/15/2022    EGFRIFNONA 101 11/19/2020    EGFRIFAFRI 95 01/11/2017    BCR 16.1 07/15/2022    K 3.9 07/15/2022    CO2 24.4 07/15/2022    CALCIUM 9.7 07/15/2022    ALBUMIN 4.70 07/15/2022    LABIL2 1.6 05/18/2018    AST 43 (H) 07/15/2022    ALT 52 (H) 07/15/2022       CBC w/diff    CBC w/Diff 6/3/22 7/15/22   WBC 7.27 7.22   RBC 5.09 4.84   Hemoglobin 14.8 14.2   Hematocrit 44.5 43.4   MCV 87.4 89.7   MCH 29.1 29.3   MCHC 33.3 32.7   RDW 12.5 11.9  (A)   Platelets 272 292   Neutrophil Rel %  57.9   Immature Granulocyte Rel %  0.3   Lymphocyte Rel %  28.7   Monocyte Rel %  8.3   Eosinophil Rel %  4.2   Basophil Rel %  0.6   (A) Abnormal value              Allergies as of 08/15/2022   • (No Known Allergies)       MEDICATIONS:  Medication list reviewed today    Review of Systems   Constitutional: Negative for activity change, appetite change, fatigue and unexpected weight change.        Denies hot flashes   Respiratory: Negative for chest tightness and shortness of breath.    Cardiovascular: Negative for chest pain and leg swelling.   Gastrointestinal: Negative for blood in stool, constipation and diarrhea.   Genitourinary: Negative for dysuria and hematuria.   Musculoskeletal: Negative for arthralgias and myalgias.        Some tightness left axilla, mild   Skin: Negative for rash and wound.   Neurological: Negative for dizziness and light-headedness.   Psychiatric/Behavioral: Negative for decreased concentration, dysphoric mood and sleep disturbance. The patient is not nervous/anxious.        Resp 18     Wt Readings from Last 3 Encounters:   07/15/22 97.8 kg (215 lb 9.6 oz)   07/15/22 97.8 kg (215 lb 11.2 oz)   06/22/22 99.2 kg (218 lb 12.8 oz)       Pain Score    08/15/22 0937   PainSc: 0-No pain       PHQ-9 Total Score:   N/A  GAD7 Total Score: N/A  Distress Score: 2  Fatigue Severity Scale: N/A      Physical Exam  Constitutional:       Appearance: Normal appearance. She is well-groomed.   HENT:      Head: Normocephalic and atraumatic.   Pulmonary:      Effort: Pulmonary effort is normal.   Skin:     General: Skin is dry.   Neurological:      Mental Status: She is alert and oriented to person, place, and time.   Psychiatric:         Attention and Perception: Attention and perception normal.         Mood and Affect: Mood and affect normal.         Speech: Speech normal.         Behavior: Behavior normal. Behavior is cooperative.         Thought Content: Thought  content normal.         Cognition and Memory: Cognition and memory normal.         Judgment: Judgment normal.           Advance Care Planning     Patient does have advance care planning complete, we do not have a copy on file    She believes she would have chosen her spouse Donnie as her healthcare surrogate.  She keeps it in her safe and its been a while since she is looked at it.  I have encouraged her to review her documents to ensure they still reflect her values and believes and discuss them with her spouse and other family members.  I encouraged her to bring a copy of her living will in for inclusion in her medical record and we discussed that it should be possible to upload this via [employer].    Brief discussion and written information provided regarding advance care planning and appropriateness for all healthy adults, choosing a healthcare surrogate, prior experiences with loved ones who have been seriously ill, and exploration of goals of care in the event of a sudden injury or illness.     Information provided on upcoming Advance Care Planning classes offered virtually each month through the [facility]. Advised arrangements can be made to schedule an appointment with myself or another advance care planning facilitator at their convenience.          DISCUSSION HELD TODAY:   Discussed NCCN recommendations for all cancer survivors of 150 minutes/week moderate intensity exercise, achieve and maintain a healthy weight, plants-based whole-foods diet, avoid tobacco and second hand smoke, avoid alcohol or minimize alcohol intake - no more than 1 drink in a day for adults.    After review of the Survivorship Treatment Summary & Care Plan, the patient verbalized understanding of recommendations for follow-up. As outlined in the care plan, they were advised to continue with follow-up care in accordance with the NCCN surveillance guidelines while transitioning back to their primary care physician for  continued general preventive and healthcare needs. We discussed the importance of healthy eating, exercise and weight management. We reviewed current guidelines for routine screening of other cancers.     A copy of the Survivorship Treatment Summary & Care Plan for Ms. Little was provided to and forwarded to the providers identified on the care team.    Problems identified:  1. Tamoxifen: She is tolerating extremely well without hot flashes mood changes or energy changes.  She has been taking 20 mg 1 time a day.  2. Osteopenia: Reviewed report from DEXA completed October 2020 which showed osteopenia.  The patient is hopeful to have future DEXA scans completed within the [facility] system.  Currently follows with primary care for cervical cancer screening.  She is hopeful she can have her DEXA which is due completed prior to her next appointment with Dr. Olivo in January.  I will send a message to the clinic nurse Jesusita ALICEA.  3. Discussed strategies for preservation of bone mass while on AI therapy including calcium and vitamin D supplementation and importance of weight bearing exercise to include moderate intensity walking most days of the week.  4. Surveillance: Reviewed guidelines for surveillance after breast cancer treatment include resumption of annual mammography of intact breast, annual history and physical and breast/chest wall exam.  Patient does express concerns regarding dense breast tissue.  Her daughter is a nurse and questions whether the patient should be having MRIs for screening.  We discussed that this is typically rare and reserved for cases at high risk.  I did encourage her to speak further with Dr. Mendoza and Dr. Olivo regarding risks and benefits of addition of MRI imaging.  We did discuss that increased imaging for surveillance does not offer improvements in benefit or outcome versus standard recommendations she does note she has had this conversation with Dr. Olivo who explained this  is well.  5. Psychosocial: Extremely pleased with aesthetic outcome of her reconstruction and right reduction mammoplasty.  Good family support.  Looking forward to traveling to [place] for 2 weeks to see her grandchildren.  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  We did explore themes of survivors guilt.  We discussed friend for life which provides matched peers support with others who have had a similar cancer experience      Plan and recommendations:  1. Walking most days of the week, strength training twice a week for bone health  2. Message sent to Dr. Olivo clinic nurse regarding patient request to have her osteopenia managed by Dr. Olivo's office  3. Annual mammography and clinical exams  4. Peers support resources discussed: [employer], memory lane syndications  5. Physical activity resources discussed: memory lane syndications, [employer] program  6. Call my office as needed at [phone] for additional information, resources or support.        ICD-10-CM ICD-9-CM   1. Ductal carcinoma in situ (DCIS) of left breast  D05.12 233.0       No orders of the defined types were placed in this encounter.      I spent 60 minutes caring for this patient on this date of service by TELEHEALTH. This time includes time spent by me in the following activities: preparing for the visit, reviewing tests, obtaining and/or reviewing a separately obtained history, performing a medically appropriate examination and/or evaluation, counseling and educating the patient/family/caregiver, referring and communicating with other health care professionals, documenting information in the medical record and care coordination

## 2022-08-18 ENCOUNTER — TELEPHONE (OUTPATIENT)
Dept: ONCOLOGY | Facility: CLINIC | Age: 57
End: 2022-08-18

## 2022-08-18 DIAGNOSIS — D05.12 DUCTAL CARCINOMA IN SITU (DCIS) OF LEFT BREAST: Primary | ICD-10-CM

## 2022-08-18 DIAGNOSIS — Z78.0 POST-MENOPAUSAL: ICD-10-CM

## 2022-09-02 NOTE — PROGRESS NOTES
"Post-op Follow-up (RECON Post Op Follow up 3m)            History of Present Illness  Kaylee Little is a 57 y.o. female who presents to [facility] as a post op follow up Left breast immediate reconstruction with implant and mesh, possible expander, right breast  Reduction and use of spy 6/6/22.    Here today for 3 month post op, she does not have any concerns but does have a few questions    Patient has no known allergies.  Allergies Reconciled.    Review of Systems   All review of system has been reviewed and it  is negative except the ones note above.     Objective     /80 (BP Location: Right arm)   Pulse 96   Temp 98 °F (36.7 °C) (Temporal)   Ht 175.3 cm (69\")   Wt 97.5 kg (215 lb)   SpO2 96%   BMI 31.75 kg/m²     Body mass index is 31.75 kg/m².    Physical Exam  Incisions are clean, dry and intact. Left breast with no seroma and still high    Result Review :   The following data was reviewed by: Marjorie Garcia MD on 06/13/2022:           Assessment and Plan      Diagnoses and all orders for this visit:    1. Breast reconstruction deformity (Primary)        Additional Order(s):       Plan:    Left breast implant is still higher than right breast however has dropped per patient. She will follow up in 3 months for new evaluation. I anticipate left breast fat graft with skin tailoring. Right breast has a depression on the inverted T, we might need to fix it.     Follow Up     No follow-ups on file.    Patient was given instructions and counseling regarding her condition. Please see specific information pulled into the AVS if appropriate.     Marjorie Garcia MD  09/12/2022      "

## 2022-09-12 ENCOUNTER — OFFICE VISIT (OUTPATIENT)
Dept: PLASTIC SURGERY | Facility: CLINIC | Age: 57
End: 2022-09-12

## 2022-09-12 VITALS
HEIGHT: 69 IN | BODY MASS INDEX: 31.84 KG/M2 | TEMPERATURE: 98 F | OXYGEN SATURATION: 96 % | HEART RATE: 96 BPM | SYSTOLIC BLOOD PRESSURE: 124 MMHG | DIASTOLIC BLOOD PRESSURE: 80 MMHG | WEIGHT: 215 LBS

## 2022-09-12 DIAGNOSIS — N65.0 BREAST RECONSTRUCTION DEFORMITY: Primary | ICD-10-CM

## 2022-09-12 PROCEDURE — 99212 OFFICE O/P EST SF 10 MIN: CPT | Performed by: SURGERY

## 2022-10-21 NOTE — PROGRESS NOTES
Patient presented to the office for drain removal. Drain was removed without difficulty. I applied bacitracin, 4x4 gauze and Tegaderm to drain site.

## 2022-10-31 ENCOUNTER — APPOINTMENT (OUTPATIENT)
Dept: BONE DENSITY | Facility: HOSPITAL | Age: 57
End: 2022-10-31

## 2022-11-02 ENCOUNTER — HOSPITAL ENCOUNTER (OUTPATIENT)
Dept: BONE DENSITY | Facility: HOSPITAL | Age: 57
Discharge: HOME | End: 2022-11-02
Admitting: INTERNAL MEDICINE

## 2022-11-02 DIAGNOSIS — Z78.0 POST-MENOPAUSAL: ICD-10-CM

## 2022-11-02 DIAGNOSIS — D05.12 DUCTAL CARCINOMA IN SITU (DCIS) OF LEFT BREAST: ICD-10-CM

## 2022-11-02 PROCEDURE — 77080 DXA BONE DENSITY AXIAL: CPT

## 2022-11-09 ENCOUNTER — LAB (OUTPATIENT)
Dept: FAMILY MEDICINE CLINIC | Facility: CLINIC | Age: 57
End: 2022-11-09

## 2022-11-09 ENCOUNTER — OFFICE VISIT (OUTPATIENT)
Dept: FAMILY MEDICINE CLINIC | Facility: CLINIC | Age: 57
End: 2022-11-09

## 2022-11-09 VITALS
BODY MASS INDEX: 31.84 KG/M2 | WEIGHT: 215 LBS | RESPIRATION RATE: 16 BRPM | SYSTOLIC BLOOD PRESSURE: 118 MMHG | HEART RATE: 95 BPM | HEIGHT: 69 IN | TEMPERATURE: 97.5 F | OXYGEN SATURATION: 98 % | DIASTOLIC BLOOD PRESSURE: 82 MMHG

## 2022-11-09 DIAGNOSIS — Z01.419 GYNECOLOGIC EXAM NORMAL: ICD-10-CM

## 2022-11-09 DIAGNOSIS — Z00.00 PREVENTATIVE HEALTH CARE: Primary | ICD-10-CM

## 2022-11-09 PROCEDURE — 99396 PREV VISIT EST AGE 40-64: CPT | Performed by: INTERNAL MEDICINE

## 2022-11-09 RX ORDER — ALENDRONATE SODIUM 70 MG/1
70 TABLET ORAL
Qty: 13 TABLET | Refills: 6 | Status: SHIPPED | OUTPATIENT
Start: 2022-11-09

## 2022-11-09 NOTE — PROGRESS NOTES
Rooming Tab(CC,VS,Pt Hx,Fall Screen)  Chief Complaint   Patient presents with   • Annual Exam   • Gynecologic Exam       Subjective      @ name @ is a @ age @ @ sex @ who presents for annual exam.    Breast cancer  The patient reports that her breast is doing well. She states that she did not have to have any radiation or chemotherapy. She notes that she is still waiting for her implant to settle. She reports that she had a reduction on one side. She states that they did not save her nipple. She notes that she was not concerned about that. She reports that she was sent for an MRI and they found 2 more suspicious areas. She states that they told her that she could be re-biopsied or she could move from a lumpectomy to a mastectomy. She notes that she does not want to go through this again.  She states that she did not feel it. She notes that she asked the surgeon about it and she was told that it was one continuous thin area. She reports that she is on tamoxifen. She states that she does not seem to be having any side effects.     The patient notes that she does have some hot flashes, but they are manageable. She reports that she takes 2 Aleve for her arthritis. She states that she is worried about her bones. She notes that she just had a DEXA scan. She reports that she does not exercise. She states that she walks on the treadmill at 3.5 miles per hour. She notes that her last DEXA scan was in 10/2021. She reports that she has a crown that needs to be replaced.  She notes that she has always had normal Pap smears. She denies any new partners. She states that she has not had gene testing. She reports that she does get her mammograms regularly. She denies any pain with intercourse.    The patient states that she has received her influenza vaccine and her fourth COVID-19 vaccine including boosters.    Anxiety and depression  The patient reports that she is in a better place. She notes that she does have days where she  "has cancer and days where she does not know if she really has cancer. She states that she does not feel like her hearing is great.    Osteopenia  The patient is currently taking Fosamax.    I have reviewed and updated her medications, medical history and problem list during today's office visit.     Patient Care Team:  Carly Worley MD as PCP - General  Carly Worley MD as PCP - Family Medicine  Adrianna Gore MD as Consulting Physician (Radiation Oncology)  Eloise Mendoza MD as Referring Physician (General Surgery)  Radha Olivo MD as Consulting Physician (Hematology and Oncology)  Marjorie Garcia MD as Consulting Physician (Plastic Surgery)    Problem List Tab  Medications Tab  Synopsis Tab  Chart Review Tab  Care Everywhere Tab  Immunizations Tab  Patient History Tab    Social History     Tobacco Use   • Smoking status: Never   • Smokeless tobacco: Never   Substance Use Topics   • Alcohol use: Yes     Alcohol/week: 2.0 standard drinks     Types: 1 Glasses of wine, 1 Cans of beer per week     Comment: Occasionally       Review of Systems    Objective     Rooming Tab(CC,VS,Pt Hx,Fall Screen)  /82   Pulse 95   Temp 97.5 °F (36.4 °C)   Resp 16   Ht 175.3 cm (69\")   Wt 97.5 kg (215 lb)   SpO2 98%   BMI 31.75 kg/m²     Body mass index is 31.75 kg/m².    Physical Exam  Vitals and nursing note reviewed.   Constitutional:       Appearance: Normal appearance. She is well-developed.   HENT:      Head: Normocephalic and atraumatic.      Right Ear: Tympanic membrane and external ear normal.      Left Ear: Tympanic membrane and external ear normal.      Nose: No rhinorrhea.      Mouth/Throat:      Pharynx: No posterior oropharyngeal erythema.   Eyes:      Conjunctiva/sclera: Conjunctivae normal.      Pupils: Pupils are equal, round, and reactive to light.   Cardiovascular:      Rate and Rhythm: Normal rate and regular rhythm.      Pulses: Normal pulses.      Heart sounds: Normal " heart sounds. No murmur heard.  Pulmonary:      Effort: Pulmonary effort is normal.      Breath sounds: Normal breath sounds.   Chest:   Breasts:     Right: No inverted nipple, mass or tenderness.      Left: No inverted nipple, mass or tenderness.   Abdominal:      General: Bowel sounds are normal.      Palpations: Abdomen is soft.      Tenderness: There is no abdominal tenderness.   Genitourinary:     Labia:         Right: No rash, tenderness or lesion.       Cervix: No cervical motion tenderness.      Uterus: Not enlarged.       Adnexa:         Right: No fullness.          Left: No fullness.        Rectum: No mass.   Musculoskeletal:         General: No tenderness.      Cervical back: Normal range of motion and neck supple.   Skin:     Capillary Refill: Capillary refill takes less than 2 seconds.      Findings: No erythema.   Neurological:      General: No focal deficit present.      Mental Status: She is alert and oriented to person, place, and time.   Psychiatric:         Mood and Affect: Mood normal.         Behavior: Behavior normal.          Statin Choice Calculator  Data Reviewed:    DEXA Bone Density Axial    Result Date: 11/3/2022  Impression: Osteoporosis  Copies of the computerized graph sheet can be obtained in the [facility] PACS or from Epic via the [employer] information department.  Electronically Signed By-Seun Navas MD On:11/3/2022 9:44 AM This report was finalized on [id number] by  Seun Navas MD.                Assessment & Plan   Order Review Tab  Health Maintenance Tab  Patient Plan/Order Tab  Diagnoses and all orders for this visit:    1. Preventative health care (Primary)  -     Comprehensive Metabolic Panel  -     CBC Auto Differential  -     Lipid Panel  -     Vitamin D,25-Hydroxy  -     TSH    2. Gynecologic exam normal  -     IGP,Aptima HPV,Age Gdln; Future    Other orders  -     alendronate (Fosamax) 70 MG tablet; Take 1 tablet by mouth Every 7 (Seven) Days.  Dispense: 13  tablet; Refill: 6      1. Osteopenia  - I advised the patient to take vitamin D and calcium.  - I advised the patient to walk 5 miles a week.    2. Anxiety and depression  - I advised the patient to let me know if her emotions are getting overwhelming.      4. Osteopenia  - I sent a prescription for Fosamax to the mail order.  - I advised the patient to sit up at least an hour before laying down once a week.  - If she is having heartburn or bone pain, she will let me know.    Wrapup Tab  Return in about 1 year (around 11/9/2023), or if symptoms worsen or fail to improve, for Annual physical.         During this visit for their annual exam, we reviewed their personal history, social history and family history.  We went over their medications and all the recommended health maintenence items for their age group. They were given the opportunity to ask questions and discuss other concerns.          Answers for HPI/ROS submitted by the patient on 11/8/2022  What is the primary reason for your visit?:         Transcribed from ambient dictation for Carly Worley MD by Jeanne Degroot.  11/09/22   15:52 EST    Patient or patient representative verbalized consent to the visit recording.  I have personally performed the services described in this document as transcribed by the above individual, and it is both accurate and complete.  Carly Worley MD  11/25/2022  17:35 EST

## 2022-11-10 ENCOUNTER — LAB (OUTPATIENT)
Dept: FAMILY MEDICINE CLINIC | Facility: CLINIC | Age: 57
End: 2022-11-10

## 2022-11-10 LAB
25(OH)D3 SERPL-MCNC: 37.6 NG/ML (ref 30–100)
ALBUMIN SERPL-MCNC: 4.5 G/DL (ref 3.5–5.2)
ALBUMIN/GLOB SERPL: 2.1 G/DL
ALP SERPL-CCNC: 68 U/L (ref 39–117)
ALT SERPL W P-5'-P-CCNC: 24 U/L (ref 1–33)
ANION GAP SERPL CALCULATED.3IONS-SCNC: 8 MMOL/L (ref 5–15)
AST SERPL-CCNC: 25 U/L (ref 1–32)
BASOPHILS # BLD AUTO: 0.04 10*3/MM3 (ref 0–0.2)
BASOPHILS NFR BLD AUTO: 0.7 % (ref 0–1.5)
BILIRUB SERPL-MCNC: 0.3 MG/DL (ref 0–1.2)
BUN SERPL-MCNC: 12 MG/DL (ref 6–20)
BUN/CREAT SERPL: 17.1 (ref 7–25)
CALCIUM SPEC-SCNC: 9.7 MG/DL (ref 8.6–10.5)
CHLORIDE SERPL-SCNC: 102 MMOL/L (ref 98–107)
CHOLEST SERPL-MCNC: 230 MG/DL (ref 0–200)
CO2 SERPL-SCNC: 29 MMOL/L (ref 22–29)
CREAT SERPL-MCNC: 0.7 MG/DL (ref 0.57–1)
DEPRECATED RDW RBC AUTO: 39.5 FL (ref 37–54)
EGFRCR SERPLBLD CKD-EPI 2021: 100.4 ML/MIN/1.73
EOSINOPHIL # BLD AUTO: 0.33 10*3/MM3 (ref 0–0.4)
EOSINOPHIL NFR BLD AUTO: 5.6 % (ref 0.3–6.2)
ERYTHROCYTE [DISTWIDTH] IN BLOOD BY AUTOMATED COUNT: 12.2 % (ref 12.3–15.4)
GLOBULIN UR ELPH-MCNC: 2.1 GM/DL
GLUCOSE SERPL-MCNC: 85 MG/DL (ref 65–99)
HCT VFR BLD AUTO: 38.2 % (ref 34–46.6)
HDLC SERPL-MCNC: 51 MG/DL (ref 40–60)
HGB BLD-MCNC: 13 G/DL (ref 12–15.9)
IMM GRANULOCYTES # BLD AUTO: 0.01 10*3/MM3 (ref 0–0.05)
IMM GRANULOCYTES NFR BLD AUTO: 0.2 % (ref 0–0.5)
LDLC SERPL CALC-MCNC: 144 MG/DL (ref 0–100)
LDLC/HDLC SERPL: 2.75 {RATIO}
LYMPHOCYTES # BLD AUTO: 1.98 10*3/MM3 (ref 0.7–3.1)
LYMPHOCYTES NFR BLD AUTO: 33.4 % (ref 19.6–45.3)
MCH RBC QN AUTO: 29.7 PG (ref 26.6–33)
MCHC RBC AUTO-ENTMCNC: 34 G/DL (ref 31.5–35.7)
MCV RBC AUTO: 87.2 FL (ref 79–97)
MONOCYTES # BLD AUTO: 0.46 10*3/MM3 (ref 0.1–0.9)
MONOCYTES NFR BLD AUTO: 7.8 % (ref 5–12)
NEUTROPHILS NFR BLD AUTO: 3.11 10*3/MM3 (ref 1.7–7)
NEUTROPHILS NFR BLD AUTO: 52.3 % (ref 42.7–76)
NRBC BLD AUTO-RTO: 0 /100 WBC (ref 0–0.2)
PLATELET # BLD AUTO: 251 10*3/MM3 (ref 140–450)
PMV BLD AUTO: 10.2 FL (ref 6–12)
POTASSIUM SERPL-SCNC: 4 MMOL/L (ref 3.5–5.2)
PROT SERPL-MCNC: 6.6 G/DL (ref 6–8.5)
RBC # BLD AUTO: 4.38 10*6/MM3 (ref 3.77–5.28)
SODIUM SERPL-SCNC: 139 MMOL/L (ref 136–145)
TRIGL SERPL-MCNC: 194 MG/DL (ref 0–150)
TSH SERPL DL<=0.05 MIU/L-ACNC: 1.64 UIU/ML (ref 0.27–4.2)
VLDLC SERPL-MCNC: 35 MG/DL (ref 5–40)
WBC NRBC COR # BLD: 5.93 10*3/MM3 (ref 3.4–10.8)

## 2022-11-10 PROCEDURE — 36415 COLL VENOUS BLD VENIPUNCTURE: CPT | Performed by: INTERNAL MEDICINE

## 2022-11-10 PROCEDURE — 80061 LIPID PANEL: CPT | Performed by: INTERNAL MEDICINE

## 2022-11-10 PROCEDURE — 85025 COMPLETE CBC W/AUTO DIFF WBC: CPT | Performed by: INTERNAL MEDICINE

## 2022-11-10 PROCEDURE — 82306 VITAMIN D 25 HYDROXY: CPT | Performed by: INTERNAL MEDICINE

## 2022-11-10 PROCEDURE — 84443 ASSAY THYROID STIM HORMONE: CPT | Performed by: INTERNAL MEDICINE

## 2022-11-10 PROCEDURE — 80053 COMPREHEN METABOLIC PANEL: CPT | Performed by: INTERNAL MEDICINE

## 2022-11-11 NOTE — PROGRESS NOTES
All labs were normal except cholesterol- but lipids are down 20 pints- continue to work on them- with diet and exercise

## 2022-11-17 LAB
AGE GDLN ACOG TESTING: NORMAL
CYTOLOGIST CVX/VAG CYTO: NORMAL
CYTOLOGY CVX/VAG DOC CYTO: NORMAL
CYTOLOGY CVX/VAG DOC THIN PREP: NORMAL
DX ICD CODE: NORMAL
HIV 1 & 2 AB SER-IMP: NORMAL
HPV GENOTYPE REFLEX: NORMAL
HPV I/H RISK 4 DNA CVX QL PROBE+SIG AMP: NEGATIVE
OTHER STN SPEC: NORMAL
STAT OF ADQ CVX/VAG CYTO-IMP: NORMAL

## 2022-12-05 NOTE — PROGRESS NOTES
"Post-op Follow-up (6m post op follow up )            History of Present Illness  Kaylee Little is a 57 y.o. female who presents to [facility] as a post op follow up Left breast immediate reconstruction with implant and mesh, possible expander, right breast  Reduction and use of spy 6/6/22.    Here today for 6 month post op, she states the left breast is still sitting higher than right. No pain or other concerns today. She does wear bralette.     Patient has no known allergies.  Allergies Reconciled.    Review of Systems   All review of system has been reviewed and it  is negative except the ones note above.     Objective     /82 (BP Location: Right arm)   Pulse 90   Temp 97.9 °F (36.6 °C) (Temporal)   Ht 174.6 cm (68.75\")   Wt 98.1 kg (216 lb 4.8 oz)   SpO2 97%   BMI 32.17 kg/m²     Body mass index is 32.17 kg/m².    Physical Exam  Good projection , right breast has an indentation at the T junction and left breast has skin excess at the medial portion of the incision. She also has more upper pole projection.     Result Review :   The following data was reviewed by: Marjorie Garcia MD on 06/13/2022:           Assessment and Plan      Diagnoses and all orders for this visit:    1. Breast reconstruction deformity (Primary)        Additional Order(s):       Plan:     Bilateral breast scar revision with right breast fat graft.   [id number] revision of reconstructed breast    Follow Up     No follow-ups on file.    Patient was given instructions and counseling regarding her condition. Please see specific information pulled into the AVS if appropriate.     Marjorie Garcia MD  12/12/2022      "

## 2022-12-12 ENCOUNTER — OFFICE VISIT (OUTPATIENT)
Dept: PLASTIC SURGERY | Facility: CLINIC | Age: 57
End: 2022-12-12

## 2022-12-12 VITALS
DIASTOLIC BLOOD PRESSURE: 82 MMHG | BODY MASS INDEX: 32.04 KG/M2 | HEART RATE: 90 BPM | TEMPERATURE: 97.9 F | WEIGHT: 216.3 LBS | SYSTOLIC BLOOD PRESSURE: 122 MMHG | OXYGEN SATURATION: 97 % | HEIGHT: 69 IN

## 2022-12-12 DIAGNOSIS — N65.0 BREAST RECONSTRUCTION DEFORMITY: Primary | ICD-10-CM

## 2022-12-12 PROCEDURE — 99212 OFFICE O/P EST SF 10 MIN: CPT | Performed by: SURGERY

## 2023-01-09 ENCOUNTER — OFFICE VISIT (OUTPATIENT)
Dept: LAB | Facility: HOSPITAL | Age: 58
End: 2023-01-09
Payer: OTHER GOVERNMENT

## 2023-01-09 ENCOUNTER — OFFICE VISIT (OUTPATIENT)
Dept: ONCOLOGY | Facility: CLINIC | Age: 58
End: 2023-01-09
Payer: OTHER GOVERNMENT

## 2023-01-09 VITALS
DIASTOLIC BLOOD PRESSURE: 75 MMHG | SYSTOLIC BLOOD PRESSURE: 104 MMHG | RESPIRATION RATE: 16 BRPM | HEART RATE: 101 BPM | HEIGHT: 69 IN | BODY MASS INDEX: 31.93 KG/M2 | WEIGHT: 215.6 LBS | TEMPERATURE: 97.5 F | OXYGEN SATURATION: 97 %

## 2023-01-09 DIAGNOSIS — D05.12 DUCTAL CARCINOMA IN SITU (DCIS) OF LEFT BREAST: Primary | ICD-10-CM

## 2023-01-09 DIAGNOSIS — D05.12 DUCTAL CARCINOMA IN SITU (DCIS) OF LEFT BREAST: ICD-10-CM

## 2023-01-09 PROCEDURE — 36415 COLL VENOUS BLD VENIPUNCTURE: CPT

## 2023-01-09 PROCEDURE — 99214 OFFICE O/P EST MOD 30 MIN: CPT | Performed by: INTERNAL MEDICINE

## 2023-01-09 NOTE — PROGRESS NOTES
Subjective   Kaylee Little is a 57 y.o. female.  Referred by Dr. 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    3/14/2022-bilateral screening mammogram  Indeterminate left breast calcifications.  Left breast diagnostic mammogram recommended.  No suspicious abnormalities of the right breast.    3/25/2022-left breast diagnostic mammogram  Suspicious left breast microcalcifications.  Stereotactic biopsy recommended.    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  ER +100%  LA negative  Ki-67 10%    4/20/2022-bilateral breast MRI  Nonspecific enhancement is seen surrounding the biopsy cavity.  Linear branching enhancement in the middle third medial aspect of the left breast at 9:00.  No mammographic correlate appreciated.  If breast conservation desired an MRI guided biopsy recommended.  Linear borderline clumped enhancement that measures to the order of 3.5 cm in the middle third upper outer quadrant of the left breast 2 o'clock position.  MRI guided biopsy recommended if breast conservation desired.  No suspicious findings for malignancy in the right breast.    6/6/2021-left breast mastectomy  Pathology consistent with ductal carcinoma in situ intermediate to high-grade  DCIS measures 16 mm  ER +100%  LA negative  4 sentinel lymph nodes negative.    Interval history  Ms. Little presents to the clinic today for follow-up.  She is overall tolerating tamoxifen well.  Denies any hot flashes mood changes or fatigue.  Denies any new left chest wall lesions or right breast masses.  Dr. Field plans to perform a fat grafting soon.  She had a DEXA scan in November 2022 which shows osteoporosis and she has been started on Fosamax for the same.    The following portions  of the patient's history were reviewed and updated as appropriate: allergies, current medications, past family history, past medical history, past social history, past surgical history and problem list.    Past Medical History:   Diagnosis Date   • AK (actinic keratosis)    • Ankle sprain    • Arthralgia    • Arthritis     HANDS   • Breast cancer (HCC) April 6, 2022   • Conjunctivitis    • Contact dermatitis, allergic    • Corneal abrasion    • Dry mouth    • Fatigue    • Osteopenia October 2020   • Rosacea    • Seborrheic keratosis         Past Surgical History:   Procedure Laterality Date   • BREAST BIOPSY Left 4/5/2022   • BREAST RECONSTRUCTION Left 06/06/2022    Procedure: Left breast immediate reconstruction with implant and mesh, possible expander, right breast  Reduction and use of spy;  Surgeon: Marjorie Garcia MD;  Location: [facility];  Service: Plastics;  Laterality: Left;   • COLONOSCOPY  2015   • COSMETIC SURGERY  Implant left breast/rt breat reduction    6/6/22   • D & C HYSTEROSCOPY  2018   • ENDOMETRIAL BIOPSY     • MASTECTOMY W/ SENTINEL NODE BIOPSY Left 06/06/2022    Procedure: LEFT BREAST SKIN SPARING MASTECTOMY WITH LEFT AXILLARY SENTINEL NODE BIOPSY;  Surgeon: Eloise Mendoza MD;  Location: [facility];  Service: General;  Laterality: Left;   • REDUCTION MAMMAPLASTY  6/6/22    Right side to match reconstruction on the left        Family History   Problem Relation Age of Onset   • Rheum arthritis Mother    • Arthritis Mother    • Diabetes Mother    • Hypertension Mother    • Cancer Father         Prostate   • COPD Father    • Arthritis Sister    • Lymphoma Sister    • Diabetes Sister    • Miscarriages / Stillbirths Sister    • Cancer Sister         Lymphoma   • Diabetes Brother    • Lung cancer Other         MEN ON DAD'S SIDE   • Other Other         BRAIN TUMOR;MEN ON DAD'S SIDE   • Malig Hyperthermia Neg Hx         Social History     Socioeconomic History   • Marital status:     Tobacco Use   • Smoking status: Never   • Smokeless tobacco: Never   Vaping Use   • Vaping Use: Never used   Substance and Sexual Activity   • Alcohol use: Yes     Alcohol/week: 2.0 standard drinks     Types: 1 Glasses of wine, 1 Cans of beer per week     Comment: Occasionally   • Drug use: Never     Comment: NO   • Sexual activity: Yes     Partners: Male     Birth control/protection: Post-menopausal        OB History    No obstetric history on file.          No Known Allergies         Review of Systems   Constitutional: Negative.    HENT: Negative.    Eyes: Negative.    Respiratory: Negative.    Cardiovascular: Negative.    Gastrointestinal: Negative.    Endocrine: Negative.    Genitourinary: Negative.  Positive for amenorrhea.   Musculoskeletal: Negative.    Neurological: Negative.    Hematological: Negative.      Review of systems as mentioned in the HPI    Objective   Resp. rate 16, height 174.6 cm (68.74\"), weight 97.8 kg (215 lb 9.6 oz), not currently breastfeeding.   Physical Exam  Vitals reviewed.   Constitutional:       Appearance: Normal appearance. She is obese.   HENT:      Head: Normocephalic and atraumatic.      Nose: Nose normal.   Eyes:      Conjunctiva/sclera: Conjunctivae normal.   Cardiovascular:      Rate and Rhythm: Normal rate.   Pulmonary:      Effort: Pulmonary effort is normal.   Musculoskeletal:         General: Normal range of motion.      Cervical back: Normal range of motion.   Skin:     General: Skin is warm.   Neurological:      General: No focal deficit present.      Mental Status: She is alert and oriented to person, place, and time.   Psychiatric:         Mood and Affect: Mood normal.         Behavior: Behavior normal.         Thought Content: Thought content normal.         Judgment: Judgment normal.       Breast Exam: Status post left breast mastectomy with reconstruction.  Right breast changes consistent with mastopexy, otherwise appears normal on inspection.  No  palpable abnormalities of the right breast.  No left or right axilla lymphadenopathy.    I have reexamined the patient and the results are consistent with the previously documented exam. Radha Olivo MD     No visits with results within 30 Day(s) from this visit.   Latest known visit with results is:   Office Visit on 11/09/2022   Component Date Value Ref Range Status   • Glucose 11/10/2022 85  65 - 99 mg/dL Final   • BUN 11/10/2022 12  6 - 20 mg/dL Final   • Creatinine 11/10/2022 0.70  0.57 - 1.00 mg/dL Final   • Sodium 11/10/2022 139  136 - 145 mmol/L Final   • Potassium 11/10/2022 4.0  3.5 - 5.2 mmol/L Final   • Chloride 11/10/2022 102  98 - 107 mmol/L Final   • CO2 11/10/2022 29.0  22.0 - 29.0 mmol/L Final   • Calcium 11/10/2022 9.7  8.6 - 10.5 mg/dL Final   • Total Protein 11/10/2022 6.6  6.0 - 8.5 g/dL Final   • Albumin 11/10/2022 4.50  3.50 - 5.20 g/dL Final   • ALT (SGPT) 11/10/2022 24  1 - 33 U/L Final   • AST (SGOT) 11/10/2022 25  1 - 32 U/L Final   • Alkaline Phosphatase 11/10/2022 68  39 - 117 U/L Final   • Total Bilirubin 11/10/2022 0.3  0.0 - 1.2 mg/dL Final   • Globulin 11/10/2022 2.1  gm/dL Final   • A/G Ratio 11/10/2022 2.1  g/dL Final   • BUN/Creatinine Ratio 11/10/2022 17.1  7.0 - 25.0 Final   • Anion Gap 11/10/2022 8.0  5.0 - 15.0 mmol/L Final   • eGFR 11/10/2022 100.4  >60.0 mL/min/1.73 Final    National Kidney Foundation and American Society of Nephrology (ASN) Task Force recommended calculation based on the Chronic Kidney Disease Epidemiology Collaboration (CKD-EPI) equation refit without adjustment for race.   • WBC 11/10/2022 5.93  3.40 - 10.80 10*3/mm3 Final   • RBC 11/10/2022 4.38  3.77 - 5.28 10*6/mm3 Final   • Hemoglobin 11/10/2022 13.0  12.0 - 15.9 g/dL Final   • Hematocrit 11/10/2022 38.2  34.0 - 46.6 % Final   • MCV 11/10/2022 87.2  79.0 - 97.0 fL Final   • MCH 11/10/2022 29.7  26.6 - 33.0 pg Final   • MCHC 11/10/2022 34.0  31.5 - 35.7 g/dL Final   • RDW 11/10/2022 12.2 (L)  12.3  - 15.4 % Final   • RDW-SD 11/10/2022 39.5  37.0 - 54.0 fl Final   • MPV 11/10/2022 10.2  6.0 - 12.0 fL Final   • Platelets 11/10/2022 251  140 - 450 10*3/mm3 Final   • Neutrophil % 11/10/2022 52.3  42.7 - 76.0 % Final   • Lymphocyte % 11/10/2022 33.4  19.6 - 45.3 % Final   • Monocyte % 11/10/2022 7.8  5.0 - 12.0 % Final   • Eosinophil % 11/10/2022 5.6  0.3 - 6.2 % Final   • Basophil % 11/10/2022 0.7  0.0 - 1.5 % Final   • Immature Grans % 11/10/2022 0.2  0.0 - 0.5 % Final   • Neutrophils, Absolute 11/10/2022 3.11  1.70 - 7.00 10*3/mm3 Final   • Lymphocytes, Absolute 11/10/2022 1.98  0.70 - 3.10 10*3/mm3 Final   • Monocytes, Absolute 11/10/2022 0.46  0.10 - 0.90 10*3/mm3 Final   • Eosinophils, Absolute 11/10/2022 0.33  0.00 - 0.40 10*3/mm3 Final   • Basophils, Absolute 11/10/2022 0.04  0.00 - 0.20 10*3/mm3 Final   • Immature Grans, Absolute 11/10/2022 0.01  0.00 - 0.05 10*3/mm3 Final   • nRBC 11/10/2022 0.0  0.0 - 0.2 /100 WBC Final   • Total Cholesterol 11/10/2022 230 (H)  0 - 200 mg/dL Final   • Triglycerides 11/10/2022 194 (H)  0 - 150 mg/dL Final   • HDL Cholesterol 11/10/2022 51  40 - 60 mg/dL Final   • LDL Cholesterol  11/10/2022 144 (H)  0 - 100 mg/dL Final   • VLDL Cholesterol 11/10/2022 35  5 - 40 mg/dL Final   • LDL/HDL Ratio 11/10/2022 2.75   Final   • 25 Hydroxy, Vitamin D 11/10/2022 37.6  30.0 - 100.0 ng/ml Final   • TSH 11/10/2022 1.640  0.270 - 4.200 uIU/mL Final   • Age Gdln ACOG Testing 11/09/2022 30-65   Final   • Diagnosis 11/09/2022 Comment   Final    NEGATIVE FOR INTRAEPITHELIAL LESION OR MALIGNANCY.   • Specimen adequacy: 11/09/2022 Comment   Final    Satisfactory for evaluation.  Endocervical and/or squamous metaplastic  cells (endocervical component) are present.   • Clinician Provided ICD-10: 11/09/2022 Comment   Final    Z01.419   • Performed by: 11/09/2022 Comment   Final    Caryn Rider, Cytotechnologist (ASCP)   • . 11/09/2022 .   Final   • Note: 11/09/2022 Comment   Final    The Pap  smear is a screening test designed to aid in the detection of  premalignant and malignant conditions of the uterine cervix.  It is not a  diagnostic procedure and should not be used as the sole means of detecting  cervical cancer.  Both false-positive and false-negative reports do occur.   • Method: 11/09/2022 Comment   Final    This liquid based ThinPrep(R) pap test was screened with the  use of an image guided system.   • HPV Aptima 11/09/2022 Negative  Negative Final    This nucleic acid amplification test detects fourteen high-risk  HPV types (16,18,31,33,35,39,45,51,52,56,58,59,66,68) without  differentiation.   • HPV Genotype Reflex 11/09/2022 Comment   Final    Criteria not met, HPV Genotype not performed.        No radiology results for the last 30 days.       Assessment & Plan       *Left breast ductal carcinoma in situ  · ER positive, ME negative  · Intermediate to high-grade  · Measures 16 mm  · Close margins of 1 mm.  Her case was discussed by Dr. Adrianna Aaron and her peer conference and radiation was not recommended.  · Given that she is less than 60 there is a benefit of anastrozole or tamoxifen however she does have significant osteopenia with a T score of -2.3.  · She was started on tamoxifen due to osteopenia.  · Started tamoxifen July 2022.  · She is tolerating that well without any problems.  No evidence of recurrent disease  · Right breast screening mammogram due March 2023, this has been scheduled.    *Osteoporosis  · DEXA scan from November 2022 reviewed and shows osteoporosis with a T score of -2.8  · She has been started on Fosamax.  · If there is improvement in bone density then we could consider switching to anastrozole    *Close margins  · Dr. Gore presented case in the peer conference and decided not to proceed with adjuvant radiation.    *Follow-up-6 months

## 2023-01-19 LAB — REF LAB TEST RESULTS: NORMAL

## 2023-01-20 ENCOUNTER — TELEPHONE (OUTPATIENT)
Dept: ONCOLOGY | Facility: CLINIC | Age: 58
End: 2023-01-20
Payer: OTHER GOVERNMENT

## 2023-01-20 NOTE — TELEPHONE ENCOUNTER
----- Message from Radha Olivo MD sent at 1/20/2023 12:42 PM EST -----  Please inform patient that genetic testing is negative.

## 2023-03-10 ENCOUNTER — PREP FOR SURGERY (OUTPATIENT)
Dept: OTHER | Facility: HOSPITAL | Age: 58
End: 2023-03-10
Payer: OTHER GOVERNMENT

## 2023-03-10 DIAGNOSIS — N65.0 BREAST RECONSTRUCTION DEFORMITY: Primary | ICD-10-CM

## 2023-03-10 RX ORDER — CEFAZOLIN SODIUM 2 G/100ML
2 INJECTION, SOLUTION INTRAVENOUS ONCE
OUTPATIENT
Start: 2023-03-10 | End: 2023-03-10

## 2023-03-15 ENCOUNTER — PATIENT MESSAGE (OUTPATIENT)
Dept: FAMILY MEDICINE CLINIC | Facility: CLINIC | Age: 58
End: 2023-03-15
Payer: OTHER GOVERNMENT

## 2023-03-15 DIAGNOSIS — Z12.31 ENCOUNTER FOR SCREENING MAMMOGRAM FOR MALIGNANT NEOPLASM OF BREAST: Primary | ICD-10-CM

## 2023-03-15 DIAGNOSIS — D05.12 DUCTAL CARCINOMA IN SITU (DCIS) OF LEFT BREAST: ICD-10-CM

## 2023-03-15 NOTE — TELEPHONE ENCOUNTER
From: Kaylee Little  To: Carly Worley  Sent: 3/15/2023 8:01 AM EDT  Subject: Mammogram    I need to schedule my annual mammogram. I have an appt with my breast surgeon on April 25 and she wants it done before that point. Could you send an order to [facility]? This will be my first mammogram since my mastectomy of the left breast so it will just be the right side.

## 2023-03-22 ENCOUNTER — HOSPITAL ENCOUNTER (OUTPATIENT)
Dept: MAMMOGRAPHY | Facility: HOSPITAL | Age: 58
Discharge: HOME | End: 2023-03-22
Admitting: INTERNAL MEDICINE
Payer: OTHER GOVERNMENT

## 2023-03-22 DIAGNOSIS — Z12.31 ENCOUNTER FOR SCREENING MAMMOGRAM FOR MALIGNANT NEOPLASM OF BREAST: ICD-10-CM

## 2023-03-22 DIAGNOSIS — D05.12 DUCTAL CARCINOMA IN SITU (DCIS) OF LEFT BREAST: ICD-10-CM

## 2023-03-22 PROCEDURE — 77067 SCR MAMMO BI INCL CAD: CPT

## 2023-03-22 PROCEDURE — 77063 BREAST TOMOSYNTHESIS BI: CPT

## 2023-03-24 ENCOUNTER — TELEPHONE (OUTPATIENT)
Dept: PLASTIC SURGERY | Facility: CLINIC | Age: 58
End: 2023-03-24

## 2023-03-24 NOTE — TELEPHONE ENCOUNTER
Called patient to schedule surgery.. Patient wanted to schedule surgery but also wanted a quote for liposuction. Informed patient that Dr. Garcia needs to go over it and she will get a call back next week to schedule surgery and collect deposit.

## 2023-04-06 ENCOUNTER — PATIENT MESSAGE (OUTPATIENT)
Dept: PLASTIC SURGERY | Facility: CLINIC | Age: 58
End: 2023-04-06
Payer: OTHER GOVERNMENT

## 2023-04-10 NOTE — TELEPHONE ENCOUNTER
4/7/23 Called patient and patient only wants to proceed with the insurance portion. Patient has been scheduled for 06/28/23 surgery. Pre-op appointment has been scheduled.

## 2023-04-10 NOTE — TELEPHONE ENCOUNTER
From: Kaylee Little  To: Marjorie Garcia  Sent: 4/6/2023 9:36 PM EDT  Subject: Upcoming fat grafting procedure    When the surgery scheduler called me to schedule my fat grafting, I asked about the possibility of a full liposuction at the same time. They were supposed to get a quote and call me back. Please let the appropriate people know I have decided against the full liposuction and would like to proceed with the fat grafting procedure as originally planned. I will wait for them to call me to schedule. Thank you.

## 2023-05-09 NOTE — PROGRESS NOTES
Assessment/Plan:    Kaylee Little is a 57 y.o. lady who presents with a history of left breast DCIS: Grade II, ER+/VA-; [id number].      A multidisciplinary plan has been formulated for the patient:  (1) Breast Surgical Oncology:  - Follow up Invitae 9 panel genetic testing: negative  - Status post left breast skin sparing mastectomy with SLN biopsy 6/2022 and bilateral breast reconstruction with right reduction mastopexy and left immediate reconstruction with prepectoral implant and mesh with Dr. Garcia  - Planning to undergo surgery for fat grafting on 6/28/23 with Dr. Garcia   - Right breast mammogram 3/2023 - BIRADS 2.   - Annual right breast mammogram due 03/2024.  - Follow up 4/2024.    (2) Medical Oncology:  - Following with Dr. Olivo, next appointment with APRN 7/13/2023.  - Invitae 47 gene panel negative  - Currently on Tamoxifen, tolerating.     (3) Radiation Oncology:  - Radiation was not recommended, discussed at [facility] conference.     Health Maintenance:  - Continue following with PCP for routine care.   - Colonoscopy: completed at age 49, on a 10 yr surveillance.     Chief Complaint: abnormal breast imaging    History of Present Illness: Ms. Kaylee Little is a 57 y.o. year old lady, seen at the request of No ref. provider found for a new diagnosis of left breast cancer.    This was initially detected as an imaging abnormality. She has had annual mammograms each year. She denies any prior history of abnormal mammograms or breast biopsies. Her work-up is detailed in the oncologic history below.   She denies any breast lumps, pain, skin changes, or nipple discharge.     6/22/2022 she presents today for follow-up.  She is having no new issues.  Her pain is controlled.  Her drain has been removed.  She has no concerns today.    5/10/2023 She is here today for a follow-up. Denies any new breast concerns - no masses or skin changes. She is planning to undergo reconstruction revision and fat grafting  in June. She is searching for a new PCP.    Workup of Current Diagnosis:    3/14/2022 Bilateral Screening Mammogram:  IMPRESSION:  Indeterminate left breast calcifications. Recommend left diagnostic  mammogram.  BI-RADS ASSESSMENT: BI-RADS 0. Incomplete    3/25/2022 Left Breast Diagnostic Mammogram:   IMPRESSION:  Suspicious left breast microcalcifications. I would recommend that the patient undergo a stereotactic guided percutaneous vacuum assisted core biopsy procedure.  ASSESSMENT:  BIRADS:  BI-RADS 4. Suspicious abnormality.    4/5/2022 Left Breast Stereotactic Biopsy:   An 8 gauge vacuum-assisted core needle biopsy device was advanced into the breast. Targeting was confirmed with prefire radiographs. 6 specimens were obtained. Specimen radiograph was then performed confirming the presence of calcifications within the specimens. A barbell-shaped clip was then placed. The biopsy clip is in expected position in the biopsy bed.   IMPRESSION :   Technically successful stereotactic biopsy of the breast, with the biopsy clip in expected position. After final pathology has been reviewed, an addendum will be dictated for concordance and further recommendations.     4/5/2022 Pathology:   Final Diagnosis   Calcifications, left breast, biopsies:    Ductal carcinoma in situ, clinging type, intermediate nuclear grade with central necrosis    No invasive carcinoma is identified    Microcalcifications identified within neoplastic and benign duct spaces     4/20/2022 Bilateral Breast MRI:  IMPRESSION:  1. Biopsy-proven malignancy in the left breast centered at the 12 o'clock position with the barbell-shaped metallic clip within a postbiopsy cavity. Nonspecific enhancement is seen surrounding the cavity. There is no evidence for left axillary adenopathy.   2. There is linear branching enhancement seen in the middle third medial aspect of the left breast at the 9 o'clock position. No mammographic correlate is appreciated. If  breast conservation therapy is desired, correlation with an MR guided left breast biopsy should be considered.  3. Linear, borderline clumped enhancement that measures on the order 3.5 cm in greatest dimension in the middle third upper outer quadrant of the left breast at the 2 o'clock position without a mammographic correlate.  If breast conservation therapy is desired, correlation with an MR guided left breast biopsy should be considered.  4. There are no findings suspicious for malignancy in the right breast.  BI-RADS category 4: Suspicious. Biopsy should be considered.    6/6/2022 Left breast skin sparing mastectomy with sentinel lymph node biopsy:  Final Diagnosis   1. Left Breast, Oriented Simple Skin Sparing Mastectomy (1321 grams): INTERMEDIATE TO HIGH-GRADE DUCTAL         CARCINOMA IN SITU (DCIS).               A. Tumor site: 12 o'clock.               B. Extent of DCIS: 60 mm (DCIS present in six breast slices from medial to lateral).   C. Architectural patterns: Cribriform, micropapillary and solid.  D. Nuclear grade: Intermediate to high (II-III).  E. Necrosis: Present, central expansive necrosis.   F. Margins: Uninvolved by DCIS.  1. DCIS comes to within 1.0 mm of the closest anterior margin (6.0 mm focus of DCIS), 50 mm from the      posterior margin, 60 mm from the medial margin, 95 mm from the superior margin, 120 mm from the       lateral margin and 130 mm from the inferior margin of resection.  G. Four benign sentinel lymph nodes (0/4).  H. Hormone receptor status: ER positive (100% strong), ME negative (less than 1%), Ki-67 10% (performed on prior biopsy [id number], slides reviewed).  I. Pathologic stage: pTis, N(sn)0.   2. [place] Lymph Node #2, Left Axilla, Excision:               A. One lymph node, negative for metastatic carcinoma (0/1).  3. Right Breast, Reduction Mammoplasty:               A. Benign unremarkable skin and breast tissue.  4. [place] Lymph Node #1, Left Axilla, Excision:                A. One lymph node, negative for metastatic carcinoma (0/1).  5. [place] Lymph Node #3, Left Axilla, Excision:               A. Two lymph nodes, negative for metastatic carcinoma (0/2).     11/2/2022 DEXA Bone Density:  IMPRESSION:  Osteoporosis    3/22/2023 Right Breast Screening Mammogram:  FINDINGS:  The breasts are heterogenously dense, which may obscure small masses. New postoperative changes in the right breast. There are no suspicious masses or suspicious calcifications in the right breast.   IMPRESSION:  No mammographic signs of malignancy in the right breast. Recommend routine mammographic screening.  BI-RADS ASSESSMENT: BI-RADS 2. Benign findings.    Past Medical History:   • Left breast DCIS  • Seborrheic keratosis  • Arthritis  • Osteoporosis    Past Surgical History:    • D&C hysteroscopy   • Left breast mastectomy with SLN bx and reconstruction     Family History:    • Sister with non-Hodgkins lymphoma     Social History:  • Denies tobacco use  • Occasional alcohol use    Allergies:   No Known Allergies    Medications:     Current Outpatient Medications:   •  alendronate (Fosamax) 70 MG tablet, Take 1 tablet by mouth Every 7 (Seven) Days., Disp: 13 tablet, Rfl: 6  •  calcium carb-cholecalciferol 600-800 MG-UNIT tablet, Take 1 tablet by mouth Daily., Disp: , Rfl:   •  cetirizine (zyrTEC) 10 MG tablet, Take 1 tablet by mouth Daily., Disp: , Rfl:   •  Cholecalciferol (D3-1000) 25 MCG (1000 UT) capsule, Take 1 capsule by mouth Daily., Disp: , Rfl:   •  metroNIDAZOLE (Metrogel) 1 % gel, Apply  topically to the appropriate area as directed Daily. (Patient taking differently: Apply  topically to the appropriate area as directed Daily As Needed.), Disp: 180 g, Rfl: 0  •  multivitamin with minerals tablet tablet, Take 1 tablet by mouth Daily., Disp: , Rfl:   •  naproxen sodium (ALEVE) 220 MG tablet, Take 2 tablets by mouth Daily. HOLDING FOR SURGERY, Disp: , Rfl:   •  Omega-3 Fatty Acids (FISH OIL PO),  Take 1 tablet by mouth Daily. HOLDING FOR SURGERY, Disp: , Rfl:   •  tamoxifen (NOLVADEX) 20 MG chemo tablet, Take 1 tablet by mouth Daily., Disp: 90 tablet, Rfl: 3    Laboratory Values:    Labs from 2022 reviewed    Review of Systems:   Constitutional: Negative for fevers or chills  HENT: Negative for hearing loss or runny nose  Eyes: Negative for vision changes or scleral icterus  Respiratory: Negative for cough or shortness of breath  Cardiovascular: Negative for chest pain or heart palpitations  Gastrointestinal: Negative for abdominal pain, nausea, vomiting, constipation, melena, or hematochezia  Genitourinary: Negative for hematuria or dysuria  Musculoskeletal: Negative for joint swelling or gait instability  Neurologic: Negative for tremors or seizures  Psychiatric: Negative for suicidal ideations or depression  All other systems reviewed and negative    Physical Exam:   • ECO - Asymptomatic  • Constitutional: Well-developed, well-nourished, no acute distress  • Eyes: Conjunctiva normal, sclera nonicteric  • ENMT: Hearing grossly normal, oral mucosa moist  • Neck: Supple, no palpable mass, trachea midline  • Respiratory: Clear to auscultation, normal inspiratory effort  • Cardiovascular: Regular rate, no peripheral edema, no jugular venous distention  • Breast:   o Right: No visible abnormalities on inspection while seated, with arms raised or hands on hips. No masses, skin changes, or nipple abnormalities. Prior mastopexy incision noted.   o Left: No visible abnormalities on inspection while seated, with arms raised or hands on hips. No masses or skin changes. Left breast mastectomy incision healed.    o Biopsy site appreciated in left outer mid breast, otherwise no skin changes.   o No clinical chest wall involvement.  • Lymphatics (palpable nodes): no cervical, supraclavicular, or axillary lymphadenopathy  • Skin:  Warm, dry, no rash on visualized skin surfaces  • Musculoskeletal: Symmetric  strength, normal gait  • Psychiatric: Alert and oriented ×3, normal affect     Felicity Nicolas PA-C    [facility] - General Surgery   [address], [address]  [place] [address]    [address], [address]  [place] [address]    Office: [phone]  Fax: [phone]

## 2023-05-10 ENCOUNTER — OFFICE VISIT (OUTPATIENT)
Dept: SURGERY | Facility: CLINIC | Age: 58
End: 2023-05-10
Payer: OTHER GOVERNMENT

## 2023-05-10 VITALS
DIASTOLIC BLOOD PRESSURE: 70 MMHG | SYSTOLIC BLOOD PRESSURE: 115 MMHG | BODY MASS INDEX: 31.84 KG/M2 | HEIGHT: 69 IN | WEIGHT: 215 LBS

## 2023-05-10 DIAGNOSIS — D05.12 DUCTAL CARCINOMA IN SITU (DCIS) OF LEFT BREAST: Primary | ICD-10-CM

## 2023-05-24 RX ORDER — TAMOXIFEN CITRATE 20 MG/1
TABLET ORAL
Qty: 90 TABLET | Refills: 1 | Status: SHIPPED | OUTPATIENT
Start: 2023-05-24

## 2023-05-26 NOTE — PROGRESS NOTES
"Pre-op Exam (Pre op 06/28/2023)            History of Present Illness  Kaylee Little is a 57 y.o. female who presents to [facility] for Pre-Operative Examination for  BREAST RECONSTRUCTION REVISION, Bilateral breast scar revision with right breast fat graft  06/282023      Subjective        Patient has no known allergies.  Allergies Reconciled.    Review of Systems   All review of system has been reviewed and it  is negative except the ones note above.     Objective     /78 (BP Location: Left arm)   Pulse 95   Ht 165.1 cm (65\")   Wt 98.9 kg (218 lb)   SpO2 95%   BMI 36.28 kg/m²     Body mass index is 36.28 kg/m².    Physical Exam   Cardiovascular: Normal rate.     Pulmonary/Chest  Effort normal.     Result Review :                Assessment and Plan      Diagnoses and all orders for this visit:    1. Pre-operative examination (Primary)  -     Nicotine Screen, Urine - Urine, Clean Catch; Future    2. Breast reconstruction deformity  -     ondansetron (Zofran) 4 MG tablet; Take 1 tablet by mouth Daily As Needed for Nausea or Vomiting.  Dispense: 20 tablet; Refill: 0  -     oxyCODONE-acetaminophen (Percocet) 5-325 MG per tablet; Take 1-2 tablets by mouth Every 6 (Six) Hours As Needed for Moderate Pain.  Dispense: 28 tablet; Refill: 0  -     cephalexin (KEFLEX) 500 MG capsule; Take 1 capsule by mouth 4 (Four) Times a Day for 5 days.  Dispense: 20 capsule; Refill: 0        Plan:  • Plan to proceed with right breast fat graft and left breast revision, capsulotomy and medial dog ear excision.   • [id number] revision of reconstructed breast  •   •   •   • Given these options, the patient has verbally expressed an understanding of the risks of surgery and finds these risks acceptable. We will proceed with surgery as soon as possible.    • Medications sent to pharmacy  • Cotinine Ordered  • Pictures obtained today       Scribed by Tatianna Gomez, acting as a scribe " for Marjorie Garcia MD, 06/19/23 08:12 EDT.  Marjorie Garcia MD's signature on the note affirms that the note adequately documents the care provided.       Follow Up     No follow-ups on file.    Patient was given instructions and counseling regarding her condition. Please see specific information pulled into the AVS if appropriate.     Marjorie Garcia MD  06/19/2023

## 2023-06-19 ENCOUNTER — OFFICE VISIT (OUTPATIENT)
Dept: PLASTIC SURGERY | Facility: CLINIC | Age: 58
End: 2023-06-19
Payer: OTHER GOVERNMENT

## 2023-06-19 VITALS
WEIGHT: 218 LBS | HEIGHT: 65 IN | BODY MASS INDEX: 36.32 KG/M2 | DIASTOLIC BLOOD PRESSURE: 78 MMHG | OXYGEN SATURATION: 95 % | HEART RATE: 95 BPM | SYSTOLIC BLOOD PRESSURE: 116 MMHG

## 2023-06-19 DIAGNOSIS — Z01.818 PRE-OPERATIVE EXAMINATION: Primary | ICD-10-CM

## 2023-06-19 DIAGNOSIS — N65.0 BREAST RECONSTRUCTION DEFORMITY: ICD-10-CM

## 2023-06-19 PROCEDURE — 99212 OFFICE O/P EST SF 10 MIN: CPT | Performed by: SURGERY

## 2023-06-19 RX ORDER — LORATADINE 10 MG/1
10 TABLET ORAL DAILY
COMMUNITY
Start: 2023-06-01

## 2023-06-19 RX ORDER — OXYCODONE HYDROCHLORIDE AND ACETAMINOPHEN 5; 325 MG/1; MG/1
1-2 TABLET ORAL EVERY 6 HOURS PRN
Qty: 28 TABLET | Refills: 0 | Status: SHIPPED | OUTPATIENT
Start: 2023-06-19

## 2023-06-19 RX ORDER — CEPHALEXIN 500 MG/1
500 CAPSULE ORAL 4 TIMES DAILY
Qty: 20 CAPSULE | Refills: 0 | Status: SHIPPED | OUTPATIENT
Start: 2023-06-19 | End: 2023-06-24

## 2023-06-19 RX ORDER — ONDANSETRON 4 MG/1
4 TABLET, FILM COATED ORAL DAILY PRN
Qty: 20 TABLET | Refills: 0 | Status: SHIPPED | OUTPATIENT
Start: 2023-06-19 | End: 2024-06-18

## 2023-06-20 LAB
COTININE UR QL SCN: NEGATIVE NG/ML
Lab: NORMAL

## 2023-07-31 NOTE — PROGRESS NOTES
"Post-op Follow-up (1m post op follow up )            History of Present Illness  Kaylee Little is a 58 y.o. female who presents to [facility]LAST[facility] RECONSTRU[facility]IVE SURGERY as a post operative follow up for BREAST RECONSTRUCTION REVISION, Bilateral breast scar revision with right breast fat graft 06/28/2023    She is here for 1m post op, no concerns or pain today       Subjective      Patient has no known allergies.  Allergies Reconciled.    Review of Systems   All review of system has been reviewed and it  is negative except the ones note above.     Objective     /70 (BP Location: Right arm)   Pulse 87   Ht 175.3 cm (69\")   Wt 99 kg (218 lb 4.8 oz)   SpO2 97%   BMI 32.24 kg/mý     Body mass index is 32.24 kg/mý.    Physical Exam   Cardiovascular: Normal rate.     Pulmonary/Chest  Effort normal.     Breasts:  Incision c/d/I, good symmetry       Result Review :              Assessment and Plan      Diagnoses and all orders for this visit:    1. Breast reconstruction deformity (Primary)    2. Ductal carcinoma in situ (DCIS) of left breast          Additional Order(s):       Plan:    Doing well, patient will proceed with nipple tattoo. Follow up in 1 year     Patient was given instructions and counseling regarding her condition. Please see specific information pulled into the AVS if appropriate.     Marjorie Garcia MD  08/07/2023     Answers submitted by the patient for this visit:  Other (Submitted on 8/6/2023)  Please describe your symptoms.: Post op  Have you had these symptoms before?: No  How long have you been having these symptoms?: Greater than 2 weeks  Primary Reason for Visit (Submitted on 8/6/2023)  What is the primary reason for your visit?: Other    "

## 2023-08-07 ENCOUNTER — OFFICE VISIT (OUTPATIENT)
Dept: PLASTIC SURGERY | Facility: CLINIC | Age: 58
End: 2023-08-07
Payer: OTHER GOVERNMENT

## 2023-08-07 VITALS
HEIGHT: 69 IN | BODY MASS INDEX: 32.33 KG/M2 | WEIGHT: 218.3 LBS | OXYGEN SATURATION: 97 % | HEART RATE: 87 BPM | DIASTOLIC BLOOD PRESSURE: 70 MMHG | SYSTOLIC BLOOD PRESSURE: 118 MMHG

## 2023-08-07 DIAGNOSIS — D05.12 DUCTAL CARCINOMA IN SITU (DCIS) OF LEFT BREAST: ICD-10-CM

## 2023-08-07 DIAGNOSIS — N65.0 BREAST RECONSTRUCTION DEFORMITY: Primary | ICD-10-CM

## 2023-08-07 PROCEDURE — 99024 POSTOP FOLLOW-UP VISIT: CPT | Performed by: SURGERY

## 2023-11-13 RX ORDER — ALENDRONATE SODIUM 70 MG/1
70 TABLET ORAL
Qty: 12 TABLET | Refills: 0 | Status: SHIPPED | OUTPATIENT
Start: 2023-11-13

## 2023-11-21 RX ORDER — ALENDRONATE SODIUM 70 MG/1
70 TABLET ORAL
Qty: 12 TABLET | Refills: 0 | Status: SHIPPED | OUTPATIENT
Start: 2023-11-21 | End: 2023-11-22 | Stop reason: SDUPTHER

## 2023-11-21 RX ORDER — TAMOXIFEN CITRATE 20 MG/1
20 TABLET ORAL DAILY
Qty: 90 TABLET | Refills: 1 | Status: SHIPPED | OUTPATIENT
Start: 2023-11-21 | End: 2023-11-22 | Stop reason: SDUPTHER

## 2023-11-22 RX ORDER — TAMOXIFEN CITRATE 20 MG/1
20 TABLET ORAL DAILY
Qty: 90 TABLET | Refills: 1 | Status: SHIPPED | OUTPATIENT
Start: 2023-11-22

## 2023-11-22 RX ORDER — ALENDRONATE SODIUM 70 MG/1
70 TABLET ORAL
Qty: 12 TABLET | Refills: 0 | Status: SHIPPED | OUTPATIENT
Start: 2023-11-22

## 2023-11-28 NOTE — PROGRESS NOTES
Subjective   Kaylee Little is a 58 y.o. female.   Chief Complaint   Patient presents with    Establish Care     Pt transferring from Dr. Carly Worley       History of Present Illness     History of Breast Cancer  -Was diagnosed on 4/20/2022  - follows up with Dr Olivo (hem/onc) and Dr Garcia (plastic surgery for breast reconstruction)  - Saw Dr Gore for radation oncology but decided they didn't need to have it done      Osteoporisis  - Broke her foot 3 years ago, got dexa scan afterward and discovered osteopenia  - last dexa showed osteoporosis (11/2022) and she was started on fosamax  - Dr Olivo will handle tamoxifen and fosamax as long as pt would follow with her      - Postmenopausal bleeding: Occurred after 1-1/2 years of being in menopause.  Had an endometrial biopsy but came back normal  -Lip lesion: When patient's daughter was a baby, she had butted patient and a blue spot developed on her bottom lip.  Patient saw plastic surgeon who told her there was just a bundle of cells that was benign and it would leave a scar if it were removed.  Patient elected not to have it removed      The following portions of the patient's history were reviewed and updated as appropriate: allergies, current medications, past family history, past medical history, past social history, past surgical history, and problem list.    Patient Active Problem List   Diagnosis    Other seborrheic keratosis    Lesion of lip    Arthralgia    Rosacea    Breast reconstruction deformity    Dry eyes    History of ductal carcinoma in situ (DCIS) of breast       Current Outpatient Medications on File Prior to Visit   Medication Sig Dispense Refill    alendronate (FOSAMAX) 70 MG tablet Take 1 tablet by mouth Every 7 (Seven) Days. 12 tablet 0    calcium carb-cholecalciferol 600-800 MG-UNIT tablet Take 1 tablet by mouth Daily.      Cholecalciferol (D3-1000) 25 MCG (1000 UT) capsule Take 1 capsule by mouth Daily.      multivitamin with  "minerals tablet tablet Take 1 tablet by mouth Daily.      naproxen sodium (ALEVE) 220 MG tablet Take 2 tablets by mouth Daily. HOLDING FOR SURGERY      Omega-3 Fatty Acids (FISH OIL PO) Take 1 tablet by mouth Daily. HOLDING FOR SURGERY      tamoxifen (NOLVADEX) 20 MG chemo tablet Take 1 tablet by mouth Daily. 90 tablet 1    loratadine (CLARITIN) 10 MG tablet Take 1 tablet by mouth Daily. (Patient not taking: Reported on 11/30/2023)       No current facility-administered medications on file prior to visit.     Current outpatient and discharge medications have been reconciled for the patient.  Reviewed by: Ana Gonzáles DO      No Known Allergies      Objective   Visit Vitals  /60 (BP Location: Right arm, Patient Position: Sitting, Cuff Size: Adult)   Pulse 103   Temp 97.4 °F (36.3 °C) (Skin)   Resp 16   Ht 175.3 cm (69\")   Wt 95.3 kg (210 lb)   SpO2 97%   BMI 31.01 kg/m²       Physical Exam  HENT:      Head: Normocephalic and atraumatic.   Eyes:      Conjunctiva/sclera: Conjunctivae normal.   Neurological:      General: No focal deficit present.      Mental Status: She is alert and oriented to person, place, and time.   Psychiatric:         Mood and Affect: Mood normal.         Behavior: Behavior normal.           Diagnoses and all orders for this visit:    1. History of ductal carcinoma in situ (DCIS) of breast (Primary)  -Per above, patient is being followed by plastic surgeon and hematology/oncology for management  -They are also managing her osteoporosis as of now       I spent 14 minutes caring for Kaylee on this date of service. This time includes time spent by me in the following activities: reviewing tests, obtaining and/or reviewing a separately obtained history, counseling and educating the patient/family/caregiver, and documenting information in the medical record        Follow Up  - 1-2 months annual physical    Expected course, medications, and adverse effects discussed as appropriate.  Call or " return if worsening or persistent symptoms.  I wore protective equipment throughout this patient encounter to include mask and eye protection. Hand hygiene was performed before donning protective equipment and after removal when leaving the room.    This document is intended for medical expert use only. Reading of this document by patients and/or patient's family without participating medical staff guidance may result in misinterpretation and unintended morbidity. Any interpretation of such data is the responsibility of the patient and/or family member responsible for the patient in concert with their primary or specialist providers, not to be left for sources of online searches such as [employer], [employer] or similar queries. Relying on these approaches to knowledge may result in misinterpretation, misguided goals of care and even death should patients or family members try recommendations outside of the realm of professional medical care.

## 2023-11-30 ENCOUNTER — OFFICE VISIT (OUTPATIENT)
Dept: FAMILY MEDICINE CLINIC | Facility: CLINIC | Age: 58
End: 2023-11-30
Payer: OTHER GOVERNMENT

## 2023-11-30 ENCOUNTER — PATIENT ROUNDING (BHMG ONLY) (OUTPATIENT)
Dept: FAMILY MEDICINE CLINIC | Facility: CLINIC | Age: 58
End: 2023-11-30
Payer: OTHER GOVERNMENT

## 2023-11-30 VITALS
BODY MASS INDEX: 31.1 KG/M2 | HEIGHT: 69 IN | DIASTOLIC BLOOD PRESSURE: 60 MMHG | OXYGEN SATURATION: 97 % | HEART RATE: 103 BPM | WEIGHT: 210 LBS | TEMPERATURE: 97.4 F | SYSTOLIC BLOOD PRESSURE: 116 MMHG | RESPIRATION RATE: 16 BRPM

## 2023-11-30 DIAGNOSIS — Z86.000 HISTORY OF DUCTAL CARCINOMA IN SITU (DCIS) OF BREAST: Primary | ICD-10-CM

## 2023-11-30 PROBLEM — H04.123 DRY EYES: Status: ACTIVE | Noted: 2023-11-30

## 2023-11-30 PROBLEM — B07.9 VERRUCA VULGARIS: Status: RESOLVED | Noted: 2020-01-17 | Resolved: 2023-11-30

## 2023-11-30 PROBLEM — N95.0 POSTMENOPAUSAL BLEEDING: Status: RESOLVED | Noted: 2018-05-18 | Resolved: 2023-11-30

## 2023-11-30 PROBLEM — S92.909D: Status: RESOLVED | Noted: 2020-11-19 | Resolved: 2023-11-30

## 2023-11-30 PROBLEM — L25.9 CONTACT DERMATITIS: Status: RESOLVED | Noted: 2017-06-30 | Resolved: 2023-11-30

## 2023-11-30 NOTE — PROGRESS NOTES
November 30, 2023    [facility], may I speak with Kaylee Little?    My name is Linda Sotomayor     I am  with [facility] [address]  [facility]  [address] [place] [address] [address]  [place] [address].    Before we get started may I verify your date of birth? 1965    I am calling to officially welcome you to our practice and ask about your recent visit. Is this a good time to talk? yes    Tell me about your visit with us. What things went well?  VERY GOOD APPOINTMENT       We're always looking for ways to make our patients' experiences even better. Do you have recommendations on ways we may improve?  no    Overall were you satisfied with your first visit to our practice? yes       I appreciate you taking the time to speak with me today. Is there anything else I can do for you? no      Thank you, and have a great day.       Pulmonary/Critical Care Brief Progress note    S  Elias Casanova is a 79 y.o. who was admitted on 10/26/2023 for the problem of rhabdomyolysis, elevated troponins, in the setting of a history of paroxysmal A-fib on chronic anticoagulation, Parkinson disease hypertension and pulmonary hypertension.    I was called at the bedside by nursing staff due to patient developed hypotension after bearing down for a bowel movement.  Yesterday he had a major difficult to recover his blood pressure despite bolus of IV fluids.    O  BP (!) 87/58   Pulse (!) 103   Temp 36.7 °C (98.1 °F)   Resp (!) 24   Wt 73.9 kg (162 lb 14.7 oz)   SpO2 99%   BMI 25.52 kg/m²     EXAM  GENERAL: Looks according to age   HENT: Head: Atraumatic, normocephalic.  Eyes: EOMI, PERRLA  Nose: no nasal congestion  Mouth: semi-moist mucous membranes, no lesions.  Throat: No tonsillar erythema, exudates or enlargement.  NECK: Trachea midline. No cervical or preauricular adenopathy. No thyromegaly palpated.  LUNGS: Clear to auscultation. Respiratory effort normal no tenderness to palpation.  HEART: Regular rate and rhythm, no murmurs.  ABDOMEN: Soft, nontender. Non distended, no masses. Bowel sounds are present and increased.  EXTREMITIES: No clubbing, no cyanosis, 3+ pedal edema.  VASCULAR: capillary refill < 3 secs, distal pulses present and equal all limbs.  NEUROLOGIC: Alerted and oriented x3. Cranial nerves 2-12 intact. [place] 15 pts. Difficult to understand as a baseline due to parkinsons disease according to the sister who is in the room    A/P  This is a patient with multiple medical problems who developed hypotension which I believe is multifactorial including use of beta-blocker, vagal reflex with a bowel movement, A-fib with decreased perfusion and adrenal insufficiency.  He is a status post IV fluid resuscitation but continued to persist dipping into the low normal systolic blood pressure, my recommendation is to continue to monitor the  urine output and mental status,   -Increase solu cortef to 50mg TID instead of BID  -recommend to consider amiodarone and hold beta blockers which could be the culprit of these episodes  -recommend to obtain an echo, ordered urgent will give us more detail and direction for therapy  -ok to use midrodrine  -MAP is 68-70 at bedside when I left the room with good urine output and good mental status.  No need to upgrade to IMCU status at this point.      Donny De Dios MD FACP FCCP  Pulmonary/Critical Care   I spent 45 min in reviewing the patient's condition, physical examination, laboratory and imaging data, prior documentation, in discussion with RN, hospitalist and family (sister at the bedside) and in formulating an assessment/plan.

## 2024-01-04 ENCOUNTER — OFFICE VISIT (OUTPATIENT)
Dept: FAMILY MEDICINE CLINIC | Facility: CLINIC | Age: 59
End: 2024-01-04
Payer: OTHER GOVERNMENT

## 2024-01-04 VITALS
SYSTOLIC BLOOD PRESSURE: 116 MMHG | RESPIRATION RATE: 16 BRPM | HEART RATE: 114 BPM | WEIGHT: 212 LBS | TEMPERATURE: 97.1 F | DIASTOLIC BLOOD PRESSURE: 60 MMHG | HEIGHT: 69 IN | BODY MASS INDEX: 31.4 KG/M2 | OXYGEN SATURATION: 99 %

## 2024-01-04 DIAGNOSIS — Z00.00 ANNUAL PHYSICAL EXAM: Primary | ICD-10-CM

## 2024-01-04 DIAGNOSIS — E55.9 VITAMIN D DEFICIENCY: ICD-10-CM

## 2024-01-04 DIAGNOSIS — E78.00 ELEVATED CHOLESTEROL: ICD-10-CM

## 2024-01-04 DIAGNOSIS — R73.09 ELEVATED GLUCOSE: ICD-10-CM

## 2024-01-04 DIAGNOSIS — Z13.29 THYROID DISORDER SCREEN: ICD-10-CM

## 2024-01-04 PROBLEM — M81.0 AGE-RELATED OSTEOPOROSIS WITHOUT CURRENT PATHOLOGICAL FRACTURE: Status: ACTIVE | Noted: 2024-01-04

## 2024-01-04 PROCEDURE — 99396 PREV VISIT EST AGE 40-64: CPT | Performed by: STUDENT IN AN ORGANIZED HEALTH CARE EDUCATION/TRAINING PROGRAM

## 2024-01-07 LAB
25(OH)D3+25(OH)D2 SERPL-MCNC: 33.4 NG/ML (ref 30–100)
CHOLEST SERPL-MCNC: 261 MG/DL (ref 100–199)
HBA1C MFR BLD: 5.7 % (ref 4.8–5.6)
HDLC SERPL-MCNC: 58 MG/DL
LDLC SERPL CALC-MCNC: 172 MG/DL (ref 0–99)
TRIGL SERPL-MCNC: 169 MG/DL (ref 0–149)
TSH SERPL DL<=0.005 MIU/L-ACNC: 1.27 UIU/ML (ref 0.45–4.5)
VLDLC SERPL CALC-MCNC: 31 MG/DL (ref 5–40)

## 2024-01-22 ENCOUNTER — APPOINTMENT (OUTPATIENT)
Dept: LAB | Facility: HOSPITAL | Age: 59
End: 2024-01-22
Payer: OTHER GOVERNMENT

## 2024-01-22 ENCOUNTER — OFFICE VISIT (OUTPATIENT)
Dept: ONCOLOGY | Facility: CLINIC | Age: 59
End: 2024-01-22
Payer: OTHER GOVERNMENT

## 2024-01-22 VITALS
DIASTOLIC BLOOD PRESSURE: 76 MMHG | OXYGEN SATURATION: 98 % | HEIGHT: 69 IN | HEART RATE: 89 BPM | TEMPERATURE: 97.7 F | BODY MASS INDEX: 29.84 KG/M2 | WEIGHT: 201.5 LBS | SYSTOLIC BLOOD PRESSURE: 108 MMHG

## 2024-01-22 DIAGNOSIS — Z78.0 POST-MENOPAUSAL: ICD-10-CM

## 2024-01-22 DIAGNOSIS — D05.12 DUCTAL CARCINOMA IN SITU (DCIS) OF LEFT BREAST: Primary | ICD-10-CM

## 2024-01-22 DIAGNOSIS — Z12.31 VISIT FOR SCREENING MAMMOGRAM: ICD-10-CM

## 2024-01-22 DIAGNOSIS — Z79.810 LONG-TERM CURRENT USE OF TAMOXIFEN: ICD-10-CM

## 2024-01-22 PROCEDURE — 99214 OFFICE O/P EST MOD 30 MIN: CPT | Performed by: INTERNAL MEDICINE

## 2024-01-22 NOTE — PROGRESS NOTES
Subjective   Kaylee Little is a 58 y.o. female.  Referred by Dr. 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    3/14/2022-bilateral screening mammogram  Indeterminate left breast calcifications.  Left breast diagnostic mammogram recommended.  No suspicious abnormalities of the right breast.    3/25/2022-left breast diagnostic mammogram  Suspicious left breast microcalcifications.  Stereotactic biopsy recommended.    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  ER +100%  MI negative  Ki-67 10%    4/20/2022-bilateral breast MRI  Nonspecific enhancement is seen surrounding the biopsy cavity.  Linear branching enhancement in the middle third medial aspect of the left breast at 9:00.  No mammographic correlate appreciated.  If breast conservation desired an MRI guided biopsy recommended.  Linear borderline clumped enhancement that measures to the order of 3.5 cm in the middle third upper outer quadrant of the left breast 2 o'clock position.  MRI guided biopsy recommended if breast conservation desired.  No suspicious findings for malignancy in the right breast.    6/6/2021-left breast mastectomy  Pathology consistent with ductal carcinoma in situ intermediate to high-grade  DCIS measures 16 mm  ER +100%  MI negative  4 sentinel lymph nodes negative.    Interval history  Ms. Little returns today for 6-month follow-up.  She continues on tamoxifen with good tolerance.  She denies any hot flashes, had some muscle cramps when she initially started tamoxifen but that resolved subsequently.  She has been compliant with tamoxifen.  She had a nipple tattoo performed recently but denies any tenderness in either breast.  Denies any new palpable abnormalities of either  breast.    The following portions of the patient's history were reviewed and updated as appropriate: allergies, current medications, past family history, past medical history, past social history, past surgical history and problem list.    Past Medical History:   Diagnosis Date    AK (actinic keratosis)     Ankle sprain     Arthralgia     Arthritis     HANDS    Breast cancer, left 04/06/2022    DX. SURGERY    Corneal abrasion     HISTORY    Dry eyes     Dry mouth     Osteopenia October 2020    Osteoporosis     Rosacea     FACE    Seasonal allergies     Seborrheic keratosis         Past Surgical History:   Procedure Laterality Date    BREAST BIOPSY Left 4/5/2022    BREAST RECONSTRUCTION Left 06/06/2022    Procedure: Left breast immediate reconstruction with implant and mesh, possible expander, right breast  Reduction and use of spy;  Surgeon: Marjorie Garcia MD;  Location: [facility];  Service: Plastics;  Laterality: Left;    BREAST RECONSTRUCTION Bilateral 06/28/2023    Procedure: BREAST RECONSTRUCTION REVISION, Bilateral breast scar revision.;  Surgeon: Marjorie Garcia MD;  Location: [facility];  Service: Plastics;  Laterality: Bilateral;    COLONOSCOPY  2015    COSMETIC SURGERY  Implant left breast/rt breat reduction    6/6/22    D & C HYSTEROSCOPY  2018    ENDOMETRIAL BIOPSY  2018    FAT GRAFTING Right 06/28/2023    Procedure: with right breast fat graft;  Surgeon: Marjorie Garcia MD;  Location: [facility];  Service: Plastics;  Laterality: Right;    MASTECTOMY W/ SENTINEL NODE BIOPSY Left 06/06/2022    Procedure: LEFT BREAST SKIN SPARING MASTECTOMY WITH LEFT AXILLARY SENTINEL NODE BIOPSY;  Surgeon: Eloise Mendoza MD;  Location: [facility];  Service: General;  Laterality: Left;    REDUCTION MAMMAPLASTY  6/6/22    Right side to match reconstruction on the left        Family History   Problem Relation Age of Onset    Rheum arthritis Mother     Diabetes Mother     Hypertension  "Mother     Cancer Father         Prostate    COPD Father     Arthritis Sister     Lymphoma Sister     Diabetes Sister     Miscarriages / Stillbirths Sister     Diabetes Brother     Lung cancer Other         MEN ON DAD'S SIDE    Other Other         BRAIN TUMOR;MEN ON DAD'S SIDE    Malig Hyperthermia Neg Hx         Social History     Socioeconomic History    Marital status:      Spouse name: Donnie   Tobacco Use    Smoking status: Never     Passive exposure: Past    Smokeless tobacco: Never   Vaping Use    Vaping Use: Never used   Substance and Sexual Activity    Alcohol use: Yes     Alcohol/week: 2.0 standard drinks of alcohol     Types: 1 Glasses of wine, 1 Cans of beer per week     Comment: 1-2 drinks a week    Drug use: Never    Sexual activity: Yes     Partners: Male     Birth control/protection: Post-menopausal     Comment: monogamous with         OB History    No obstetric history on file.          No Known Allergies         Review of Systems   Constitutional: Negative.    HENT: Negative.     Eyes: Negative.    Respiratory: Negative.     Cardiovascular: Negative.    Gastrointestinal: Negative.    Endocrine: Negative.    Genitourinary: Negative.  Positive for amenorrhea.   Musculoskeletal: Negative.    Neurological: Negative.    Hematological: Negative.      Review of systems as mentioned HPI otherwise negative    Objective   Blood pressure 108/76, pulse 89, temperature 97.7 °F (36.5 °C), temperature source Temporal, height 175.3 cm (69\"), weight 91.4 kg (201 lb 8 oz), SpO2 98%, not currently breastfeeding.   Physical Exam  Vitals reviewed.   Constitutional:       Appearance: Normal appearance.   HENT:      Head: Normocephalic and atraumatic.      Nose: Nose normal.   Eyes:      Conjunctiva/sclera: Conjunctivae normal.   Cardiovascular:      Rate and Rhythm: Normal rate.   Pulmonary:      Effort: Pulmonary effort is normal.   Musculoskeletal:         General: Normal range of motion.      Cervical " back: Normal range of motion.   Skin:     General: Skin is warm.   Neurological:      General: No focal deficit present.      Mental Status: She is alert and oriented to person, place, and time.   Psychiatric:         Mood and Affect: Mood normal.         Behavior: Behavior normal.         Thought Content: Thought content normal.         Judgment: Judgment normal.       Breast Exam: Status post left breast mastectomy with reconstruction and fat grafting, left nipple tattooed.  Right breast changes consistent with mastopexy.  Tattooing of the scar of the right breast performed.  At the 6 o'clock position along the scar line there is nodular lesions which is consistent with scar tissue.    I have reexamined the patient and the results are consistent with the previously documented exam. Radha Olivo MD      Office Visit on 01/04/2024   Component Date Value Ref Range Status    25 Hydroxy, Vitamin D 01/06/2024 33.4  30.0 - 100.0 ng/mL Final    Comment: Vitamin D deficiency has been defined by the [place] of  [employer] and an Endocrine Society practice guideline as a  level of serum 25-OH vitamin D less than 20 ng/mL (1,2).  The Endocrine Society went on to further define vitamin D  insufficiency as a level between 21 and 29 ng/mL (2).  1. IOM ([place] [employer]). 2010. Dietary reference     intakes for calcium and D. [place]: The     National Academies Press.  2. Gayle MF, Kinjal NC, Angella MCCORMICK, et al.     Evaluation, treatment, and prevention of vitamin D     deficiency: an Endocrine Society clinical practice     guideline. JCEM. 2011 Jul; 96(7):1911-30.      TSH 01/06/2024 1.270  0.450 - 4.500 uIU/mL Final    Total Cholesterol 01/06/2024 261 (H)  100 - 199 mg/dL Final    Triglycerides 01/06/2024 169 (H)  0 - 149 mg/dL Final    HDL Cholesterol 01/06/2024 58  >39 mg/dL Final    VLDL Cholesterol Jamaal 01/06/2024 31  5 - 40 mg/dL Final    LDL Chol Calc (NIH) 01/06/2024 172 (H)  0 - 99 mg/dL Final     Hemoglobin A1C 01/06/2024 5.7 (H)  4.8 - 5.6 % Final    Comment:          Prediabetes: 5.7 - 6.4           Diabetes: >6.4           Glycemic control for adults with diabetes: <7.0          No radiology results for the last 30 days.       Assessment & Plan     *Left breast ductal carcinoma in situ  ER positive, IA negative  Intermediate to high-grade  Measures 16 mm  Close margins of 1 mm.  Her case was discussed by Dr. Adrianna Aaron and her peer conference and radiation was not recommended.  Given that she is less than 60 there is a benefit of anastrozole or tamoxifen however she does have significant osteopenia with a T score of -2.3.  She was started on tamoxifen due to osteopenia.  Started tamoxifen July 2022.  Rating tamoxifen well  No evidence of recurrent disease  Right breast screening mammogram is due March 2024 and ordered today    *Osteoporosis  DEXA scan from November 2022 reviewed and shows osteoporosis with a T score of -2.8  Continue Fosamax  Tolerating Fosamax well.  If bone density improves we can consider transitioning to anastrozole  Repeat DEXA November 2024    *Close margins  Dr. Gore presented case in the peer conference and decided not to proceed with adjuvant radiation.    PLAN:  1.  Continue to tamoxifen 20 mg daily.  2.  Return in 6 months with APRN in 1 year with MD  3.  Next DEXA scan due November 2024.  Pending results consider switching from tamoxifen to anastrozole.

## 2024-03-27 ENCOUNTER — HOSPITAL ENCOUNTER (OUTPATIENT)
Dept: MAMMOGRAPHY | Facility: HOSPITAL | Age: 59
Discharge: HOME | End: 2024-03-27
Admitting: INTERNAL MEDICINE
Payer: OTHER GOVERNMENT

## 2024-03-27 DIAGNOSIS — Z12.31 VISIT FOR SCREENING MAMMOGRAM: ICD-10-CM

## 2024-03-27 PROCEDURE — 77067 SCR MAMMO BI INCL CAD: CPT

## 2024-03-27 PROCEDURE — 77063 BREAST TOMOSYNTHESIS BI: CPT

## 2024-04-15 ENCOUNTER — TELEPHONE (OUTPATIENT)
Dept: FAMILY MEDICINE CLINIC | Facility: CLINIC | Age: 59
End: 2024-04-15
Payer: OTHER GOVERNMENT

## 2024-04-15 DIAGNOSIS — E78.2 MIXED HYPERLIPIDEMIA: Primary | ICD-10-CM

## 2024-04-15 NOTE — TELEPHONE ENCOUNTER
Her cholesterol has increased since last year.  On 1/6/2024, had recommended increasing aerobic physical activity and decrease amount of cholesterol in her diet.  Rechecking cholesterol after 3 months of lifestyle changes

## 2024-04-15 NOTE — TELEPHONE ENCOUNTER
Spoke to pt 04/15/2024, 1:28 pm advised pt per Dr. Gonzáles it is time to repeat her Lipid panel, be sure to fast prior to test

## 2024-05-08 ENCOUNTER — OFFICE VISIT (OUTPATIENT)
Dept: SURGERY | Facility: CLINIC | Age: 59
End: 2024-05-08
Payer: OTHER GOVERNMENT

## 2024-05-08 VITALS
HEIGHT: 69 IN | SYSTOLIC BLOOD PRESSURE: 118 MMHG | BODY MASS INDEX: 28.91 KG/M2 | DIASTOLIC BLOOD PRESSURE: 88 MMHG | WEIGHT: 195.2 LBS

## 2024-05-08 DIAGNOSIS — Z09 ENCOUNTER FOR FOLLOW-UP: ICD-10-CM

## 2024-05-08 DIAGNOSIS — D05.12 DUCTAL CARCINOMA IN SITU (DCIS) OF LEFT BREAST: Primary | ICD-10-CM

## 2024-05-09 LAB
CHOLEST SERPL-MCNC: 213 MG/DL (ref 100–199)
HDLC SERPL-MCNC: 65 MG/DL
LDLC SERPL CALC-MCNC: 121 MG/DL (ref 0–99)
TRIGL SERPL-MCNC: 155 MG/DL (ref 0–149)
VLDLC SERPL CALC-MCNC: 27 MG/DL (ref 5–40)

## 2024-05-21 RX ORDER — ALENDRONATE SODIUM 70 MG/1
70 TABLET ORAL
Qty: 12 TABLET | Refills: 3 | Status: SHIPPED | OUTPATIENT
Start: 2024-05-21

## 2024-06-26 DIAGNOSIS — D05.12 DUCTAL CARCINOMA IN SITU (DCIS) OF LEFT BREAST: Primary | ICD-10-CM

## 2024-06-26 RX ORDER — TAMOXIFEN CITRATE 20 MG/1
20 TABLET ORAL DAILY
Qty: 90 TABLET | Refills: 3 | Status: SHIPPED | OUTPATIENT
Start: 2024-06-26

## 2024-07-18 ENCOUNTER — TELEPHONE (OUTPATIENT)
Dept: ONCOLOGY | Facility: CLINIC | Age: 59
End: 2024-07-18

## 2024-07-18 NOTE — TELEPHONE ENCOUNTER
Caller: Kaylee Little    Relationship to patient: Self    Best call back number: [phone]    Chief complaint:     Type of visit: VITALS & F/U 1    Requested date: CALL TO R/S    If rescheduling, when is the original appointment: 7/22/2024    Additional notes:

## 2024-08-13 ENCOUNTER — TELEPHONE (OUTPATIENT)
Dept: ONCOLOGY | Facility: CLINIC | Age: 59
End: 2024-08-13

## 2024-08-13 NOTE — TELEPHONE ENCOUNTER
Caller: Kaylee Little    Relationship to patient: Self    Best call back number: [phone]    Chief complaint: R/S    Type of visit: VITALS ONLY & F/U 1    Requested date: ANY EXCEPT 8/20/24  - NEEDS AN APPT. BETWEEN 10 - 12    If rescheduling, when is the original appointment: 8/15/24

## 2024-08-20 ENCOUNTER — PRIOR AUTHORIZATION (OUTPATIENT)
Dept: ONCOLOGY | Facility: CLINIC | Age: 59
End: 2024-08-20
Payer: OTHER GOVERNMENT

## 2024-08-26 NOTE — PROGRESS NOTES
Subjective   Kaylee Little is a 59 y.o. female.  Referred by Dr. 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    3/14/2022-bilateral screening mammogram  Indeterminate left breast calcifications.  Left breast diagnostic mammogram recommended.  No suspicious abnormalities of the right breast.    3/25/2022-left breast diagnostic mammogram  Suspicious left breast microcalcifications.  Stereotactic biopsy recommended.    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  ER +100%  WA negative  Ki-67 10%    4/20/2022-bilateral breast MRI  Nonspecific enhancement is seen surrounding the biopsy cavity.  Linear branching enhancement in the middle third medial aspect of the left breast at 9:00.  No mammographic correlate appreciated.  If breast conservation desired an MRI guided biopsy recommended.  Linear borderline clumped enhancement that measures to the order of 3.5 cm in the middle third upper outer quadrant of the left breast 2 o'clock position.  MRI guided biopsy recommended if breast conservation desired.  No suspicious findings for malignancy in the right breast.    6/6/2021-left breast mastectomy  Pathology consistent with ductal carcinoma in situ intermediate to high-grade  DCIS measures 16 mm  ER +100%  WA negative  4 sentinel lymph nodes negative.    Interval history  Kaylee returns today for follow up.  She continues on Tamoxifen 20 mg daily.  She continues tolerating tamoxifen well aside from leg cramps.  Leg cramps have slightly worsened over the last 1-2 days, her daughter and granddaughter recently moved in with her and she admits she has been much more busy and has not been taking his good care of herself.  Admits she may be dehydrated, she has not been drinking  as much fluid as she normally does.  No lower extremity edema.  Denies any new chest wall or breast abnormalities.  Denies cough, shortness of breath, nausea, vomiting, abdominal pain, bone pain.    The following portions of the patient's history were reviewed and updated as appropriate: allergies, current medications, past family history, past medical history, past social history, past surgical history and problem list.    Past Medical History:   Diagnosis Date    AK (actinic keratosis)     Ankle sprain     Arthralgia     Arthritis     HANDS    Breast cancer, left 04/06/2022    DX. SURGERY    Corneal abrasion     HISTORY    Dry eyes     Dry mouth     Osteopenia October 2020    Osteoporosis     Rosacea     FACE    Seasonal allergies     Seborrheic keratosis         Past Surgical History:   Procedure Laterality Date    BREAST BIOPSY Left 4/5/2022    BREAST RECONSTRUCTION Left 06/06/2022    Procedure: Left breast immediate reconstruction with implant and mesh, possible expander, right breast  Reduction and use of spy;  Surgeon: Marjorie Garcia MD;  Location: [facility];  Service: Plastics;  Laterality: Left;    BREAST RECONSTRUCTION Bilateral 06/28/2023    Procedure: BREAST RECONSTRUCTION REVISION, Bilateral breast scar revision.;  Surgeon: Marjorie Garcia MD;  Location: [facility] OR;  Service: Plastics;  Laterality: Bilateral;    COLONOSCOPY  2015    COSMETIC SURGERY  Implant left breast/rt breat reduction    6/6/22    D & C HYSTEROSCOPY  2018    ENDOMETRIAL BIOPSY  2018    FAT GRAFTING Right 06/28/2023    Procedure: with right breast fat graft;  Surgeon: Marjorie Garcia MD;  Location: [facility] OR;  Service: Plastics;  Laterality: Right;    MASTECTOMY W/ SENTINEL NODE BIOPSY Left 06/06/2022    Procedure: LEFT BREAST SKIN SPARING MASTECTOMY WITH LEFT AXILLARY SENTINEL NODE BIOPSY;  Surgeon: Eloise Mendoza MD;  Location: [facility] OR;  Service: General;  Laterality: Left;    REDUCTION  "MAMMAPLASTY  6/6/22    Right side to match reconstruction on the left        Family History   Problem Relation Age of Onset    Rheum arthritis Mother     Diabetes Mother     Hypertension Mother     Cancer Father         Prostate    COPD Father     Arthritis Sister     Lymphoma Sister     Diabetes Sister     Miscarriages / Stillbirths Sister     Diabetes Brother     Lung cancer Other         MEN ON DAD'S SIDE    Other Other         BRAIN TUMOR;MEN ON DAD'S SIDE    Malig Hyperthermia Neg Hx         Social History     Socioeconomic History    Marital status:      Spouse name: Donnie   Tobacco Use    Smoking status: Never     Passive exposure: Past    Smokeless tobacco: Never   Vaping Use    Vaping status: Never Used   Substance and Sexual Activity    Alcohol use: Yes     Alcohol/week: 2.0 standard drinks of alcohol     Types: 1 Glasses of wine, 1 Cans of beer per week     Comment: Occasionally    Drug use: Never    Sexual activity: Yes     Partners: Male     Birth control/protection: Post-menopausal     Comment: monogamous with         OB History    No obstetric history on file.          No Known Allergies     Review of systems as mentioned HPI otherwise negative    Objective   Blood pressure 99/68, pulse 85, temperature 98.3 °F (36.8 °C), temperature source Oral, height 175.3 cm (69.02\"), weight 90.6 kg (199 lb 12.8 oz), SpO2 98%, not currently breastfeeding.     Physical Exam  Vitals reviewed.   Constitutional:       Appearance: Normal appearance.   HENT:      Head: Normocephalic and atraumatic.      Nose: Nose normal.   Eyes:      Conjunctiva/sclera: Conjunctivae normal.   Cardiovascular:      Rate and Rhythm: Normal rate.   Pulmonary:      Effort: Pulmonary effort is normal.   Musculoskeletal:         General: Normal range of motion.      Cervical back: Normal range of motion.   Skin:     General: Skin is warm.   Neurological:      General: No focal deficit present.      Mental Status: She is alert and " oriented to person, place, and time.   Psychiatric:         Mood and Affect: Mood normal.         Behavior: Behavior normal.         Thought Content: Thought content normal.         Judgment: Judgment normal.       Breast Exam: Status post left breast mastectomy with reconstruction and fat grafting, left nipple tattooed.  Right breast changes consistent with mastopexy.  Tattooing of the scar of the right breast performed.  At the 6 o'clock position along the scar line there is nodular lesions which is consistent with scar tissue.    I have reexamined the patient and the results are consistent with the previously documented exam. RADHA Peña      No visits with results within 30 Day(s) from this visit.   Latest known visit with results is:   Telephone on 04/15/2024   Component Date Value Ref Range Status    Total Cholesterol 05/08/2024 213 (H)  100 - 199 mg/dL Final    Triglycerides 05/08/2024 155 (H)  0 - 149 mg/dL Final    HDL Cholesterol 05/08/2024 65  >39 mg/dL Final    VLDL Cholesterol Jamaal 05/08/2024 27  5 - 40 mg/dL Final    LDL Chol Calc (NIH) 05/08/2024 121 (H)  0 - 99 mg/dL Final        No radiology results for the last 30 days.       Assessment & Plan     *Left breast ductal carcinoma in situ  ER positive, GA negative  Intermediate to high-grade  Measures 16 mm  Close margins of 1 mm.  Her case was discussed by Dr. Adrianna Aaron and her peer conference and radiation was not recommended.  Given that she is less than 60 there is a benefit of anastrozole or tamoxifen however she does have significant osteopenia with a T score of -2.3.  She was started on tamoxifen due to osteopenia.  Started tamoxifen July 2022.  8/30/2024: Continues Tamoxifen 20 mg daily, tolerating well aside from leg cramps.  These have overall remained stable, may be slightly worsened over the last 1-2 days however she has not been staying well-hydrated as her daughter and granddaughter just moved in with her.  Right breast  screening mammogram 3/27/2024 benign.     *Osteoporosis  DEXA scan from November 2022 reviewed and shows osteoporosis with a T score of -2.8  Continue Fosamax  Tolerating Fosamax well.  If bone density improves we can consider transitioning to anastrozole  Repeat DEXA scheduled November 2024    *Close margins  Dr. Goer presented case in the peer conference and decided not to proceed with adjuvant radiation.    PLAN:   Continue tamoxifen 20 mg daily.  Right breast screening mammogram due March 2025, ordered today.  DEXA scheduled 11/4/2024.  Pending results consider switching from tamoxifen to anastrozole.  6 months MD    The patient is on high risk medication that requires close monitoring for toxicity.

## 2024-08-30 ENCOUNTER — OFFICE VISIT (OUTPATIENT)
Dept: ONCOLOGY | Facility: CLINIC | Age: 59
End: 2024-08-30
Payer: OTHER GOVERNMENT

## 2024-08-30 VITALS
TEMPERATURE: 98.3 F | SYSTOLIC BLOOD PRESSURE: 99 MMHG | OXYGEN SATURATION: 98 % | HEIGHT: 69 IN | WEIGHT: 199.8 LBS | HEART RATE: 85 BPM | DIASTOLIC BLOOD PRESSURE: 68 MMHG | BODY MASS INDEX: 29.59 KG/M2

## 2024-08-30 DIAGNOSIS — D05.12 DUCTAL CARCINOMA IN SITU (DCIS) OF LEFT BREAST: Primary | ICD-10-CM

## 2024-08-30 DIAGNOSIS — Z79.810 LONG-TERM CURRENT USE OF TAMOXIFEN: ICD-10-CM

## 2024-11-04 ENCOUNTER — HOSPITAL ENCOUNTER (OUTPATIENT)
Dept: BONE DENSITY | Facility: HOSPITAL | Age: 59
Discharge: HOME | End: 2024-11-04
Admitting: INTERNAL MEDICINE
Payer: OTHER GOVERNMENT

## 2024-11-04 DIAGNOSIS — Z78.0 POST-MENOPAUSAL: ICD-10-CM

## 2024-11-04 DIAGNOSIS — D05.12 DUCTAL CARCINOMA IN SITU (DCIS) OF LEFT BREAST: ICD-10-CM

## 2024-11-04 DIAGNOSIS — Z79.810 LONG-TERM CURRENT USE OF TAMOXIFEN: ICD-10-CM

## 2024-11-04 PROCEDURE — 77080 DXA BONE DENSITY AXIAL: CPT

## 2025-01-24 ENCOUNTER — OFFICE VISIT (OUTPATIENT)
Dept: ONCOLOGY | Facility: CLINIC | Age: 60
End: 2025-01-24
Payer: OTHER GOVERNMENT

## 2025-01-24 VITALS
RESPIRATION RATE: 16 BRPM | TEMPERATURE: 98 F | BODY MASS INDEX: 31.27 KG/M2 | DIASTOLIC BLOOD PRESSURE: 70 MMHG | OXYGEN SATURATION: 98 % | SYSTOLIC BLOOD PRESSURE: 102 MMHG | HEIGHT: 69 IN | HEART RATE: 91 BPM | WEIGHT: 211.1 LBS

## 2025-01-24 DIAGNOSIS — D05.12 DUCTAL CARCINOMA IN SITU (DCIS) OF LEFT BREAST: Primary | ICD-10-CM

## 2025-01-24 DIAGNOSIS — Z79.810 LONG-TERM CURRENT USE OF TAMOXIFEN: ICD-10-CM

## 2025-01-24 PROCEDURE — 99214 OFFICE O/P EST MOD 30 MIN: CPT | Performed by: INTERNAL MEDICINE

## 2025-01-24 NOTE — PROGRESS NOTES
Subjective   Kaylee Little is a 59 y.o. female.  Referred by Dr. 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    3/14/2022-bilateral screening mammogram  Indeterminate left breast calcifications.  Left breast diagnostic mammogram recommended.  No suspicious abnormalities of the right breast.    3/25/2022-left breast diagnostic mammogram  Suspicious left breast microcalcifications.  Stereotactic biopsy recommended.    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  ER +100%  TN negative  Ki-67 10%    4/20/2022-bilateral breast MRI  Nonspecific enhancement is seen surrounding the biopsy cavity.  Linear branching enhancement in the middle third medial aspect of the left breast at 9:00.  No mammographic correlate appreciated.  If breast conservation desired an MRI guided biopsy recommended.  Linear borderline clumped enhancement that measures to the order of 3.5 cm in the middle third upper outer quadrant of the left breast 2 o'clock position.  MRI guided biopsy recommended if breast conservation desired.  No suspicious findings for malignancy in the right breast.    6/6/2021-left breast mastectomy  Pathology consistent with ductal carcinoma in situ intermediate to high-grade  DCIS measures 16 mm  ER +100%  TN negative  4 sentinel lymph nodes negative.    Interval history  Kaylee returns today for follow up.  She continues tamoxifen 20 mg daily  She has been experiencing some hot flashes and some increased anxiety for the past few weeks but overall improved in the past week.  She reports stressful situation at home with her daughter having to undergo divorce.  Other than that she seems to be tolerating tamoxifen well.  Denies any new breast lesions.      The following  portions of the patient's history were reviewed and updated as appropriate: allergies, current medications, past family history, past medical history, past social history, past surgical history and problem list.    Past Medical History:   Diagnosis Date    AK (actinic keratosis)     Ankle sprain     Arthralgia     Arthritis     HANDS    Breast cancer, left 04/06/2022    DX. SURGERY    Corneal abrasion     HISTORY    Dry eyes     Dry mouth     Osteopenia October 2020    Osteoporosis     Rosacea     FACE    Seasonal allergies     Seborrheic keratosis         Past Surgical History:   Procedure Laterality Date    BREAST BIOPSY Left 4/5/2022    BREAST RECONSTRUCTION Left 06/06/2022    Procedure: Left breast immediate reconstruction with implant and mesh, possible expander, right breast  Reduction and use of spy;  Surgeon: Marjorie Garcia MD;  Location: [facility];  Service: Plastics;  Laterality: Left;    BREAST RECONSTRUCTION Bilateral 06/28/2023    Procedure: BREAST RECONSTRUCTION REVISION, Bilateral breast scar revision.;  Surgeon: Marjorie Garcia MD;  Location: [facility];  Service: Plastics;  Laterality: Bilateral;    COLONOSCOPY  2015    COSMETIC SURGERY  Implant left breast/rt breat reduction    6/6/22    D & C HYSTEROSCOPY  2018    ENDOMETRIAL BIOPSY  2018    FAT GRAFTING Right 06/28/2023    Procedure: with right breast fat graft;  Surgeon: Marjorie Garcia MD;  Location: [facility];  Service: Plastics;  Laterality: Right;    MASTECTOMY W/ SENTINEL NODE BIOPSY Left 06/06/2022    Procedure: LEFT BREAST SKIN SPARING MASTECTOMY WITH LEFT AXILLARY SENTINEL NODE BIOPSY;  Surgeon: Eloise Mendoza MD;  Location: [facility];  Service: General;  Laterality: Left;    REDUCTION MAMMAPLASTY  6/6/22    Right side to match reconstruction on the left        Family History   Problem Relation Age of Onset    Rheum arthritis Mother     Diabetes Mother     Hypertension Mother     Cancer Father   "       Prostate    COPD Father     Arthritis Sister     Lymphoma Sister     Diabetes Sister     Miscarriages / Stillbirths Sister     Diabetes Brother     Lung cancer Other         MEN ON DAD'S SIDE    Other Other         BRAIN TUMOR;MEN ON DAD'S SIDE    Malig Hyperthermia Neg Hx         Social History     Socioeconomic History    Marital status:      Spouse name: Donnie   Tobacco Use    Smoking status: Never     Passive exposure: Past    Smokeless tobacco: Never   Vaping Use    Vaping status: Never Used   Substance and Sexual Activity    Alcohol use: Yes     Alcohol/week: 2.0 standard drinks of alcohol     Types: 1 Glasses of wine, 1 Cans of beer per week     Comment: Occasionally    Drug use: Never    Sexual activity: Yes     Partners: Male     Birth control/protection: Post-menopausal     Comment: monogamous with         OB History    No obstetric history on file.          No Known Allergies     Review of systems as mentioned HPI otherwise negative    Objective   Blood pressure 102/70, pulse 91, temperature 98 °F (36.7 °C), temperature source Oral, resp. rate 16, height 175.3 cm (69.02\"), weight 95.8 kg (211 lb 1.6 oz), SpO2 98%, not currently breastfeeding.     Physical Exam  Vitals reviewed.   Constitutional:       Appearance: Normal appearance.   HENT:      Head: Normocephalic and atraumatic.      Nose: Nose normal.   Eyes:      Conjunctiva/sclera: Conjunctivae normal.   Cardiovascular:      Rate and Rhythm: Normal rate.   Pulmonary:      Effort: Pulmonary effort is normal.   Musculoskeletal:         General: Normal range of motion.      Cervical back: Normal range of motion.   Skin:     General: Skin is warm.   Neurological:      General: No focal deficit present.      Mental Status: She is alert and oriented to person, place, and time.   Psychiatric:         Mood and Affect: Mood normal.         Behavior: Behavior normal.         Thought Content: Thought content normal.         Judgment: " Judgment normal.       Breast Exam: Status post left breast mastectomy with reconstruction and fat grafting, left nipple tattooed.  Right breast changes consistent with mastopexy.  Tattooing of the scar of the right breast performed.  At the 6 o'clock position along the scar line there is nodular lesions which is consistent with scar tissue.    I have reexamined the patient and the results are consistent with the previously documented exam. Radha Olivo MD      No visits with results within 30 Day(s) from this visit.   Latest known visit with results is:   Telephone on 04/15/2024   Component Date Value Ref Range Status    Total Cholesterol 05/08/2024 213 (H)  100 - 199 mg/dL Final    Triglycerides 05/08/2024 155 (H)  0 - 149 mg/dL Final    HDL Cholesterol 05/08/2024 65  >39 mg/dL Final    VLDL Cholesterol Jamaal 05/08/2024 27  5 - 40 mg/dL Final    LDL Chol Calc (NIH) 05/08/2024 121 (H)  0 - 99 mg/dL Final        No radiology results for the last 30 days.       Assessment & Plan     *Left breast ductal carcinoma in situ  ER positive, DE negative  Intermediate to high-grade  Measures 16 mm  Close margins of 1 mm.  Her case was discussed by Dr. Adrianna Aaron and her peer conference and radiation was not recommended.  Given that she is less than 60 there is a benefit of anastrozole or tamoxifen however she does have significant osteopenia with a T score of -2.3.  She was started on tamoxifen due to osteopenia.  Started tamoxifen July 2022.  8/30/2024: Continues Tamoxifen 20 mg daily, tolerating well aside from leg cramps.  These have overall remained stable, may be slightly worsened over the last 1-2 days however she has not been staying well-hydrated as her daughter and granddaughter just moved in with her.  Right breast screening mammogram 3/27/2024 benign.   No evidence of recurrent disease  Although she has some mild hot flashes and anxiety in the past few weeks she feels like this is overall better in the past  week and would like to continue with tamoxifen.  We discussed that the bone density has improved and there is an opportunity to switch to anastrozole if she would like to however after discussing the pros and cons we decided that she will continue tamoxifen.    *Osteoporosis  DEXA scan from November 2022 reviewed and shows osteoporosis with a T score of -2.8  Continue Fosamax  Tolerating Fosamax well.  If bone density improves we can consider transitioning to anastrozole  Repeat DEXA in November 2024 shows improvement in bone density with now osteopenia, continue Fosamax and tamoxifen.    *Close margins  Dr. Gore presented case in the peer conference and decided not to proceed with adjuvant radiation.    *Hot flashes  Secondary to tamoxifen  Mild and tolerable  Continue to monitor    *Lower extremity cramps  Secondary to tamoxifen  Continue to monitor  Using some acne centimeters cream which has helped.    PLAN:   Continue tamoxifen 20 mg daily.  Right breast screening mammogram due March 2025, ordered today.  DEXA due November 2026  Discussed possible switch to anastrozole however we will hold off at this time as she has tolerated tamoxifen overall well and also bone density still in the osteopenia range with a T-score of -2.2.  6 months APRN in 1 year MD    The patient is on high risk medication that requires close monitoring for toxicity.

## 2025-03-20 NOTE — PROGRESS NOTES
"Chief Complaint  Chief Complaint   Patient presents with    Annual Exam       Subjective    History of Present Illness        Kaylee Little presents to [facility] for   Kaylee Little is a 59 y.o. female here for her annual physical with me. Kaylee is here for coordination of medical care, to discuss health maintenance, disease prevention as well as to followup on medical problems.     Annual Physical Exam  Patient's last Physical Exam was 01/04/2024. Patient's medical history, medications and allergy lists were reviewed and updated.  Activity level is moderate.   Exercises 3 per week.   Appetite is good.   Feels well with few complaints.   Energy level is good.   Sleeps fairly well.   Patient reports colonoscopy at [facility] 09/10/2014, repeat in 10 years (pt signed release of records).  Dr. Galvin is covered by [employer] and pt agreeable to referral  Patient's last mammogram was 03/27/2024.   Patient is doing routine self skin exam monthly.   Patient is doing routine self-breast exams monthly  Patient's menstrual cycles are:   Post menopause  Last pap smear was done:  11/09/2022  Cytology and HPV negative  -Pt will contact insurance for coverage of Prevnar 20  -Pt will get COVID vaccine at a later date      Right foot pain  -Patient states when she walks for a long period of time, the toes will get numb and she feels like there is a \"knot\" in the ball of her foot  -Wondering if she needs to see podiatry      Family History   Problem Relation Age of Onset    Rheum arthritis Mother     Diabetes Mother     Hypertension Mother     Cancer Father         Prostate    COPD Father     Arthritis Sister     Lymphoma Sister     Diabetes Sister     Miscarriages / Stillbirths Sister     Diabetes Brother     Lung cancer Other         MEN ON DAD'S SIDE    Other Other         BRAIN TUMOR;MEN ON DAD'S SIDE    Malig Hyperthermia Neg Hx        Social History     Tobacco Use    Smoking status: Never     " Passive exposure: Past    Smokeless tobacco: Never   Vaping Use    Vaping status: Never Used   Substance Use Topics    Alcohol use: Yes     Alcohol/week: 2.0 standard drinks of alcohol     Types: 1 Glasses of wine, 1 Cans of beer per week     Comment: 1 drink per sitting, 3x a month    Drug use: Never       Past Surgical History:   Procedure Laterality Date    BREAST BIOPSY Left 4/5/2022    BREAST RECONSTRUCTION Left 06/06/2022    Procedure: Left breast immediate reconstruction with implant and mesh, possible expander, right breast  Reduction and use of spy;  Surgeon: Marjorie Garcia MD;  Location: [facility];  Service: Plastics;  Laterality: Left;    BREAST RECONSTRUCTION Bilateral 06/28/2023    Procedure: BREAST RECONSTRUCTION REVISION, Bilateral breast scar revision.;  Surgeon: Marjorie Garcia MD;  Location: [facility];  Service: Plastics;  Laterality: Bilateral;    COLONOSCOPY  2015    D & C HYSTEROSCOPY  2018    ENDOMETRIAL BIOPSY  2018    FAT GRAFTING Right 06/28/2023    Procedure: with right breast fat graft;  Surgeon: Marjorie Garcia MD;  Location: [facility];  Service: Plastics;  Laterality: Right;    MASTECTOMY W/ SENTINEL NODE BIOPSY Left 06/06/2022    Procedure: LEFT BREAST SKIN SPARING MASTECTOMY WITH LEFT AXILLARY SENTINEL NODE BIOPSY;  Surgeon: Eloise Mendoza MD;  Location: [facility];  Service: General;  Laterality: Left;    REDUCTION MAMMAPLASTY  06/06/2022    Right side to match reconstruction on the left 2/2 cancer       Patient Active Problem List   Diagnosis    Other seborrheic keratosis    Lesion of lip    Arthralgia    Rosacea    Breast reconstruction deformity    Dry eyes    History of ductal carcinoma in situ (DCIS) of breast    Age-related osteoporosis without current pathological fracture    Mixed hyperlipidemia    Prediabetes         Current Outpatient Medications:     alendronate (FOSAMAX) 70 MG tablet, Take 1 tablet by mouth Every 7 (Seven) Days.,  "Disp: 12 tablet, Rfl: 3    calcium carb-cholecalciferol 600-800 MG-UNIT tablet, Take 1 tablet by mouth Daily., Disp: , Rfl:     Cholecalciferol (D3-1000) 25 MCG (1000 UT) capsule, Take 1 capsule by mouth Daily., Disp: , Rfl:     loratadine (CLARITIN) 10 MG tablet, Take 1 tablet by mouth Daily., Disp: , Rfl:     multivitamin with minerals tablet tablet, Take 1 tablet by mouth Daily., Disp: , Rfl:     naproxen sodium (ALEVE) 220 MG tablet, Take 2 tablets by mouth Daily. HOLDING FOR SURGERY, Disp: , Rfl:     Omega-3 Fatty Acids (FISH OIL PO), Take 1 tablet by mouth Daily. HOLDING FOR SURGERY, Disp: , Rfl:     tamoxifen (NOLVADEX) 20 MG chemo tablet, TAKE 1 TABLET DAILY, Disp: 90 tablet, Rfl: 3    Objective   /66 (BP Location: Left arm, Patient Position: Sitting, Cuff Size: Adult)   Pulse 102   Temp 98.1 °F (36.7 °C) (Skin)   Resp 16   Ht 175.3 cm (69.02\")   Wt 92.1 kg (203 lb)   SpO2 95%   BMI 29.96 kg/m²   BP Readings from Last 3 Encounters:   03/21/25 104/66   01/24/25 102/70   08/30/24 99/68     Wt Readings from Last 3 Encounters:   03/21/25 92.1 kg (203 lb)   01/24/25 95.8 kg (211 lb 1.6 oz)   08/30/24 90.6 kg (199 lb 12.8 oz)     Physical Exam  Constitutional:       General: She is not in acute distress.  HENT:      Head: Normocephalic and atraumatic.      Right Ear: Tympanic membrane normal.      Left Ear: Tympanic membrane normal.      Nose: Nose normal.      Mouth/Throat:      Mouth: Mucous membranes are moist.      Pharynx: Oropharynx is clear. No posterior oropharyngeal erythema.   Eyes:      Extraocular Movements: Extraocular movements intact.      Conjunctiva/sclera: Conjunctivae normal.   Neck:      Comments: - Thyroid not enlarged  Cardiovascular:      Rate and Rhythm: Normal rate and regular rhythm.      Heart sounds: Normal heart sounds.   Pulmonary:      Effort: Pulmonary effort is normal.      Breath sounds: Normal breath sounds. No stridor. No wheezing.   Abdominal:      General: Abdomen " is flat. Bowel sounds are normal.      Palpations: Abdomen is soft. There is no mass.      Tenderness: There is no abdominal tenderness. There is no guarding.   Musculoskeletal:         General: No swelling or deformity. Normal range of motion.      Cervical back: Normal range of motion and neck supple.   Skin:     General: Skin is warm and dry.      Capillary Refill: Capillary refill takes less than 2 seconds.      Coloration: Skin is not jaundiced.      Findings: No rash.   Neurological:      General: No focal deficit present.      Mental Status: She is alert and oriented to person, place, and time.      Cranial Nerves: No cranial nerve deficit.      Motor: No weakness.      Coordination: Coordination normal.      Gait: Gait normal.      Deep Tendon Reflexes: Reflexes normal.   Psychiatric:         Mood and Affect: Mood normal.         Behavior: Behavior normal.         Thought Content: Thought content normal.             Assessment and Plan   Diagnoses and all orders for this visit:    1. Annual physical exam (Primary)  -Normal 59-year-old female exam  -Pertinent screening labs ordered per below  -Breast cancer screening: Up-to-date  -Colon cancer screening: Ordered per below  -Cervical cancer screening: Collected today    2. Right foot pain  -     Ambulatory Referral to Podiatry: Dr Harrington (covered by [facility])  -Patient having pain in the sole of her foot after walking for long periods of time, she might need an orthotic or foot support to help relieve this for her    3. Drug side effects  -     CBC & Differential  -     Comprehensive metabolic panel    4. Mixed hyperlipidemia  -     Lipid panel    5. Age-related osteoporosis without current pathological fracture  -     Vitamin D,25-Hydroxy    6. Prediabetes  -     Hemoglobin A1c    7. Cervical cancer screening  -     Pap IG, HPV-hr    8. Colon cancer screening  -     Ambulatory Referral For Screening Colonoscopy: Dr Nate Galvin    9. Overweight with body mass  index (BMI) of 29 to 29.9 in adult  BMI is >= 25 and <30. (Overweight) The following options were offered after discussion;: exercise counseling/recommendations and nutrition counseling/recommendations         Follow Up   - 1 year for annual physical exam      The patient was counseled regarding nutrition, physical activity, healthy weight, injury prevention, immunizations and preventative health screenings. Expected course, medications, and adverse effects discussed.  Call or return if worsening or persistent symptoms.  I wore protective equipment throughout this patient encounter including a mask and eye protection.   The complete contents of the Assessment and Plan and Data / Lab Results as documented above have been reviewed and addressed by myself with the patient today as part of an ongoing evaluation / treatment plan.

## 2025-03-21 ENCOUNTER — OFFICE VISIT (OUTPATIENT)
Dept: FAMILY MEDICINE CLINIC | Facility: CLINIC | Age: 60
End: 2025-03-21
Payer: OTHER GOVERNMENT

## 2025-03-21 VITALS
BODY MASS INDEX: 30.07 KG/M2 | DIASTOLIC BLOOD PRESSURE: 66 MMHG | OXYGEN SATURATION: 95 % | SYSTOLIC BLOOD PRESSURE: 104 MMHG | WEIGHT: 203 LBS | HEIGHT: 69 IN | RESPIRATION RATE: 16 BRPM | HEART RATE: 102 BPM | TEMPERATURE: 98.1 F

## 2025-03-21 DIAGNOSIS — Z12.11 COLON CANCER SCREENING: ICD-10-CM

## 2025-03-21 DIAGNOSIS — Z00.00 ANNUAL PHYSICAL EXAM: Primary | ICD-10-CM

## 2025-03-21 DIAGNOSIS — R73.03 PREDIABETES: ICD-10-CM

## 2025-03-21 DIAGNOSIS — M79.671 RIGHT FOOT PAIN: ICD-10-CM

## 2025-03-21 DIAGNOSIS — E66.3 OVERWEIGHT WITH BODY MASS INDEX (BMI) OF 29 TO 29.9 IN ADULT: ICD-10-CM

## 2025-03-21 DIAGNOSIS — Z12.4 CERVICAL CANCER SCREENING: ICD-10-CM

## 2025-03-21 DIAGNOSIS — E78.2 MIXED HYPERLIPIDEMIA: ICD-10-CM

## 2025-03-21 DIAGNOSIS — T88.7XXA DRUG SIDE EFFECTS: ICD-10-CM

## 2025-03-21 DIAGNOSIS — M81.0 AGE-RELATED OSTEOPOROSIS WITHOUT CURRENT PATHOLOGICAL FRACTURE: ICD-10-CM

## 2025-03-22 LAB
25(OH)D3+25(OH)D2 SERPL-MCNC: 46.2 NG/ML (ref 30–100)
ALBUMIN SERPL-MCNC: 4.9 G/DL (ref 3.8–4.9)
ALP SERPL-CCNC: 56 IU/L (ref 44–121)
ALT SERPL-CCNC: 22 IU/L (ref 0–32)
AST SERPL-CCNC: 27 IU/L (ref 0–40)
BASOPHILS # BLD AUTO: 0 X10E3/UL (ref 0–0.2)
BASOPHILS NFR BLD AUTO: 1 %
BILIRUB SERPL-MCNC: 0.4 MG/DL (ref 0–1.2)
BUN SERPL-MCNC: 12 MG/DL (ref 8–27)
BUN/CREAT SERPL: 17 (ref 12–28)
CALCIUM SERPL-MCNC: 10.3 MG/DL (ref 8.7–10.3)
CHLORIDE SERPL-SCNC: 103 MMOL/L (ref 96–106)
CHOLEST SERPL-MCNC: 237 MG/DL (ref 100–199)
CO2 SERPL-SCNC: 25 MMOL/L (ref 20–29)
CREAT SERPL-MCNC: 0.72 MG/DL (ref 0.57–1)
EGFRCR SERPLBLD CKD-EPI 2021: 96 ML/MIN/1.73
EOSINOPHIL # BLD AUTO: 0.2 X10E3/UL (ref 0–0.4)
EOSINOPHIL NFR BLD AUTO: 3 %
ERYTHROCYTE [DISTWIDTH] IN BLOOD BY AUTOMATED COUNT: 11.9 % (ref 11.7–15.4)
GLOBULIN SER CALC-MCNC: 2.4 G/DL (ref 1.5–4.5)
GLUCOSE SERPL-MCNC: 89 MG/DL (ref 70–99)
HBA1C MFR BLD: 5.5 % (ref 4.8–5.6)
HCT VFR BLD AUTO: 43.1 % (ref 34–46.6)
HDLC SERPL-MCNC: 65 MG/DL
HGB BLD-MCNC: 14.3 G/DL (ref 11.1–15.9)
IMM GRANULOCYTES # BLD AUTO: 0 X10E3/UL (ref 0–0.1)
IMM GRANULOCYTES NFR BLD AUTO: 0 %
LDLC SERPL CALC-MCNC: 143 MG/DL (ref 0–99)
LYMPHOCYTES # BLD AUTO: 2.9 X10E3/UL (ref 0.7–3.1)
LYMPHOCYTES NFR BLD AUTO: 42 %
MCH RBC QN AUTO: 29.2 PG (ref 26.6–33)
MCHC RBC AUTO-ENTMCNC: 33.2 G/DL (ref 31.5–35.7)
MCV RBC AUTO: 88 FL (ref 79–97)
MONOCYTES # BLD AUTO: 0.6 X10E3/UL (ref 0.1–0.9)
MONOCYTES NFR BLD AUTO: 8 %
NEUTROPHILS # BLD AUTO: 3.3 X10E3/UL (ref 1.4–7)
NEUTROPHILS NFR BLD AUTO: 46 %
PLATELET # BLD AUTO: 301 X10E3/UL (ref 150–450)
POTASSIUM SERPL-SCNC: 4.7 MMOL/L (ref 3.5–5.2)
PROT SERPL-MCNC: 7.3 G/DL (ref 6–8.5)
RBC # BLD AUTO: 4.9 X10E6/UL (ref 3.77–5.28)
SODIUM SERPL-SCNC: 142 MMOL/L (ref 134–144)
TRIGL SERPL-MCNC: 165 MG/DL (ref 0–149)
VLDLC SERPL CALC-MCNC: 29 MG/DL (ref 5–40)
WBC # BLD AUTO: 7 X10E3/UL (ref 3.4–10.8)

## 2025-03-26 ENCOUNTER — RESULTS FOLLOW-UP (OUTPATIENT)
Dept: FAMILY MEDICINE CLINIC | Facility: CLINIC | Age: 60
End: 2025-03-26
Payer: OTHER GOVERNMENT

## 2025-03-26 PROBLEM — Z87.898 HISTORY OF PREDIABETES: Status: ACTIVE | Noted: 2025-03-26

## 2025-03-26 PROBLEM — R73.03 PREDIABETES: Status: RESOLVED | Noted: 2025-03-21 | Resolved: 2025-03-26

## 2025-03-27 LAB
CYTOLOGIST CVX/VAG CYTO: NORMAL
CYTOLOGY CVX/VAG DOC CYTO: NORMAL
CYTOLOGY CVX/VAG DOC THIN PREP: NORMAL
DX ICD CODE: NORMAL
HPV I/H RISK 4 DNA CVX QL PROBE+SIG AMP: NEGATIVE
NCCN CRITERIA FLAG: NORMAL
OTHER STN SPEC: NORMAL
SERVICE CMNT-IMP: NORMAL
STAT OF ADQ CVX/VAG CYTO-IMP: NORMAL

## 2025-03-28 ENCOUNTER — HOSPITAL ENCOUNTER (OUTPATIENT)
Dept: MAMMOGRAPHY | Facility: HOSPITAL | Age: 60
Discharge: HOME | End: 2025-03-28
Admitting: NURSE PRACTITIONER
Payer: OTHER GOVERNMENT

## 2025-03-28 DIAGNOSIS — D05.12 DUCTAL CARCINOMA IN SITU (DCIS) OF LEFT BREAST: ICD-10-CM

## 2025-03-28 PROCEDURE — 77063 BREAST TOMOSYNTHESIS BI: CPT

## 2025-03-28 PROCEDURE — 77067 SCR MAMMO BI INCL CAD: CPT

## 2025-03-31 ENCOUNTER — RESULTS FOLLOW-UP (OUTPATIENT)
Dept: ONCOLOGY | Facility: CLINIC | Age: 60
End: 2025-03-31
Payer: OTHER GOVERNMENT

## 2025-04-28 RX ORDER — ALENDRONATE SODIUM 70 MG/1
70 TABLET ORAL
Qty: 12 TABLET | Refills: 3 | Status: SHIPPED | OUTPATIENT
Start: 2025-04-28

## 2025-05-13 ENCOUNTER — PATIENT MESSAGE (OUTPATIENT)
Dept: FAMILY MEDICINE CLINIC | Facility: CLINIC | Age: 60
End: 2025-05-13
Payer: OTHER GOVERNMENT

## 2025-05-14 ENCOUNTER — TELEPHONE (OUTPATIENT)
Dept: FAMILY MEDICINE CLINIC | Facility: CLINIC | Age: 60
End: 2025-05-14
Payer: OTHER GOVERNMENT

## 2025-05-14 DIAGNOSIS — D05.12 DUCTAL CARCINOMA IN SITU (DCIS) OF LEFT BREAST: Primary | ICD-10-CM

## 2025-05-14 NOTE — TELEPHONE ENCOUNTER
"Per patient via [employer],  \"I have an upcoming annual appointment with my surgeon, Dr. Eloise Mendoza‘s office that did my breast surgery. I have noticed that my referral to see her has . Can we please request a new one? Or an extension?\"  "

## 2025-05-29 NOTE — PROGRESS NOTES
Assessment/Plan:    59 y.o. female with a history of left breast DCIS: Grade II, ER+/WI-; [id number].      A multidisciplinary plan has been formulated for the patient:  (1) Breast Surgical Oncology:  - Follow up Invitae 9 panel genetic testing: negative  - Status post left breast skin sparing mastectomy with sentinel lymph node biopsy 6/2022 and bilateral breast reconstruction with right reduction mastopexy and left immediate reconstruction with prepectoral implant and mesh with Dr. Garcia  - Status post bilateral breast reconstruction revision with fat grafting and left scar revision 6/28/23 with Dr. Garcia.  - Right breast mammogram 3/2025 BIRADS 1. Annual right breast mammogram due 03/2026.   - On clinical breast exam, there is a palpable area of tissue density on the patient's right breast around 6:00, along her previous mastopexy scar.  Plan for diagnostic mammogram and ultrasound of this area.  - Follow up in one year.    (2) Medical Oncology:  - Following with Dr. Olivo.  - Invitae 47 gene panel negative  - Currently on Tamoxifen.    (3) Radiation Oncology:  - Radiation was not recommended, discussed at [facility] conference.     (4) Health Maintenance:   - Colonoscopy: completed at age 49, on a 10 year surveillance. Discussed that she is due for screening colonoscopy.  She is currently working with her PCP to get a GI referral for colonoscopy.  We discussed that if she would have any issues setting this up, she may call the office to schedule colonoscopy with Dr. Mendoza.     Chief Complaint: routine followup breast cancer    History of Presenting Illness:   4/18/2022 Seen by Dr. Mendoza:  Ms. Kaylee Little is a 59 y.o. year old lady, seen at the request of No ref. provider found for a new diagnosis of left breast cancer.    This was initially detected as an imaging abnormality. She has had annual mammograms each year. She denies any prior history of abnormal mammograms or breast biopsies. Her work-up is  detailed in the oncologic history below.   She denies any breast lumps, pain, skin changes, or nipple discharge.     6/22/2022 Seen by Dr. Mendoza:  she presents today for follow-up.  She is having no new issues.  Her pain is controlled.  Her drain has been removed.  She has no concerns today.    5/10/2023 She is here today for a follow-up. Denies any new breast concerns - no masses or skin changes. She is planning to undergo reconstruction revision and fat grafting in June. She is searching for a new PCP.    5/8/2024 She is here today for a follow-up. She is doing well. Denies any new breast masses or skin changes. She is tolerating the Tamoxifen. She had nipple tattooing since she was here last and is pleased with the results. She will be due for a colonoscopy next year, will plan to have that done in [place].     5/30/2025 She presents today for follow up. She is overall doing well. She has no concerns with her breast exam. She is following with oncology. She is on Tamoxifen. She is having no side effects. She is up to date on PCP and gyn exams. She is up to date on endoscopy; her next colonoscopy is due this year.        Workup of Current Diagnosis:    3/14/2022 Bilateral Screening Mammogram:  IMPRESSION: Indeterminate left breast calcifications. Recommend left diagnostic mammogram.  BI-RADS ASSESSMENT: BI-RADS 0. Incomplete    3/25/2022 Left Breast Diagnostic Mammogram:   IMPRESSION: Suspicious left breast microcalcifications. Recommend that the patient undergo a stereotactic guided percutaneous vacuum assisted core biopsy procedure.  ASSESSMENT: BI-RADS 4. Suspicious abnormality.    4/5/2022 Left Breast Stereotactic Biopsy:   An 8 gauge vacuum-assisted core needle biopsy device was advanced into the breast. Targeting was confirmed with prefire radiographs. 6 specimens were obtained. Specimen radiograph was then performed confirming the presence of calcifications within the specimens. A barbell-shaped clip was  then placed. The biopsy clip is in expected position in the biopsy bed.   IMPRESSION: Technically successful stereotactic biopsy of the breast, with the biopsy clip in expected position. After final pathology has been reviewed, an addendum will be dictated for concordance and further recommendations.   Addendum: There are findings consistent with ductal carcinoma in situ which is concordant with the imaging findings. This was intermediate nuclear grade with central necrosis. There was no invasive carcinoma seen within the specimen. Microcalcifications were identified within neoplastic and benign ducts spaces. Recommend appropriate surgical and oncologic management of this patient.  Pathology:   Final Diagnosis   Calcifications, left breast, biopsies:    Ductal carcinoma in situ, clinging type, intermediate nuclear grade with central necrosis    No invasive carcinoma is identified    Microcalcifications identified within neoplastic and benign duct spaces     4/20/2022 Bilateral Breast MRI:  IMPRESSION:  1. Biopsy-proven malignancy in the left breast centered at the 12 o'clock position with the barbell-shaped metallic clip within a postbiopsy cavity. Nonspecific enhancement is seen surrounding the cavity. There is no evidence for left axillary adenopathy.   2. There is linear branching enhancement seen in the middle third medial aspect of the left breast at the 9 o'clock position. No mammographic correlate is appreciated. If breast conservation therapy is desired, correlation with an MR guided left breast biopsy should be considered.  3. Linear, borderline clumped enhancement that measures on the order 3.5 cm in greatest dimension in the middle third upper outer quadrant of the left breast at the 2 o'clock position without a mammographic correlate.  If breast conservation therapy is desired, correlation with an MR guided left breast biopsy should be considered.  4. There are no findings suspicious for malignancy in  the right breast.  BI-RADS category 4: Suspicious. Biopsy should be considered.    6/6/2022 Left breast skin sparing mastectomy with sentinel lymph node biopsy:  Final Diagnosis   1. Left Breast, Oriented Simple Skin Sparing Mastectomy (1321 grams): INTERMEDIATE TO HIGH-GRADE DUCTAL CARCINOMA IN SITU (DCIS).              A. Tumor site: 12 o'clock.              B. Extent of DCIS: 60 mm (DCIS present in six breast slices from medial to lateral).   C. Architectural patterns: Cribriform, micropapillary and solid.  D. Nuclear grade: Intermediate to high (II-III).  E. Necrosis: Present, central expansive necrosis.   F. Margins: Uninvolved by DCIS.  1. DCIS comes to within 1.0 mm of the closest anterior margin (6.0 mm focus of DCIS), 50 mm from the      posterior margin, 60 mm from the medial margin, 95 mm from the superior margin, 120 mm from the       lateral margin and 130 mm from the inferior margin of resection.  G. Four benign sentinel lymph nodes (0/4).  H. Hormone receptor status: ER positive (100% strong), PA negative (less than 1%), Ki-67 10% (performed on prior biopsy [id number], slides reviewed).  I. Pathologic stage: pTis, N(sn)0.   2. [place] Lymph Node #2, Left Axilla, Excision:               A. One lymph node, negative for metastatic carcinoma (0/1).  3. Right Breast, Reduction Mammoplasty:               A. Benign unremarkable skin and breast tissue.  4. [place] Lymph Node #1, Left Axilla, Excision:               A. One lymph node, negative for metastatic carcinoma (0/1).  5. [place] Lymph Node #3, Left Axilla, Excision:               A. Two lymph nodes, negative for metastatic carcinoma (0/2).     11/2/2022 DEXA Bone Density:  IMPRESSION:  Osteoporosis    3/22/2023 Right Breast Screening Mammogram:  FINDINGS:  The breasts are heterogenously dense, which may obscure small masses. New postoperative changes in the right breast. There are no suspicious masses or suspicious calcifications in the right breast.    IMPRESSION:  No mammographic signs of malignancy in the right breast. Recommend routine mammographic screening.  BI-RADS ASSESSMENT: BI-RADS 2. Benign findings.    3/27/2024 Right Modified Screening Mammogram:  FINDINGS:  The breast is heterogeneously dense, which may obscure small masses.  Stable post-surgical changes in the right breast. There are no suspicious masses, suspicious microcalcifications, or new architectural distortion in the right breast.  IMPRESSION:  No mammographic signs of malignancy in the right breast. Recommend routine mammographic screening.  BI-RADS ASSESSMENT: BI-RADS 2. Benign findings.    3/28/2025 right breast screening mammogram:  Impression:  No mammographic evidence of malignancy.  Recommend annual screening mammography.  BI-RADS Category 1.      Past Medical History:  Arthritis, left breast cancer  Osteoporosis  Rosacea    Past Surgical History:    Past Surgical History:   Procedure Laterality Date    BREAST BIOPSY Left 4/5/2022    BREAST RECONSTRUCTION Left 06/06/2022    Procedure: Left breast immediate reconstruction with implant and mesh, possible expander, right breast  Reduction and use of spy;  Surgeon: Marjorie Garcia MD;  Location: [facility];  Service: Plastics;  Laterality: Left;    BREAST RECONSTRUCTION Bilateral 06/28/2023    Procedure: BREAST RECONSTRUCTION REVISION, Bilateral breast scar revision.;  Surgeon: Marjorie Garcia MD;  Location: [facility];  Service: Plastics;  Laterality: Bilateral;    COLONOSCOPY  2015    D & C HYSTEROSCOPY  2018    ENDOMETRIAL BIOPSY  2018    FAT GRAFTING Right 06/28/2023    Procedure: with right breast fat graft;  Surgeon: Majrorie Garcai MD;  Location: [facility];  Service: Plastics;  Laterality: Right;    MASTECTOMY W/ SENTINEL NODE BIOPSY Left 06/06/2022    Procedure: LEFT BREAST SKIN SPARING MASTECTOMY WITH LEFT AXILLARY SENTINEL NODE BIOPSY;  Surgeon: Eloise Mendoza MD;  Location: [facility] OR;   Service: General;  Laterality: Left;    REDUCTION MAMMAPLASTY  06/06/2022    Right side to match reconstruction on the left 2/2 cancer      Family History:    As above    Social History:  Denies tobacco use  Occasional alcohol use    Allergies:   No Known Allergies    Medications:     Current Outpatient Medications:     alendronate (FOSAMAX) 70 MG tablet, TAKE 1 TABLET EVERY 7 DAYS, Disp: 12 tablet, Rfl: 3    calcium carb-cholecalciferol 600-800 MG-UNIT tablet, Take 1 tablet by mouth Daily., Disp: , Rfl:     Cholecalciferol (D3-1000) 25 MCG (1000 UT) capsule, Take 1 capsule by mouth Daily., Disp: , Rfl:     loratadine (CLARITIN) 10 MG tablet, Take 1 tablet by mouth Daily., Disp: , Rfl:     multivitamin with minerals tablet tablet, Take 1 tablet by mouth Daily., Disp: , Rfl:     naproxen sodium (ALEVE) 220 MG tablet, Take 2 tablets by mouth Daily. HOLDING FOR SURGERY, Disp: , Rfl:     Omega-3 Fatty Acids (FISH OIL PO), Take 1 tablet by mouth Daily. HOLDING FOR SURGERY, Disp: , Rfl:     tamoxifen (NOLVADEX) 20 MG chemo tablet, TAKE 1 TABLET DAILY, Disp: 90 tablet, Rfl: 3    Laboratory Values:    Labs from 3/27/2025 reviewed.  [facility] genetic risk assessment questionnaire, NCCN not met.  CBC and CMP from 3/21/2025 unremarkable.    Review of Systems:   Constitutional: Negative for fevers or chills  HENT: Negative for hearing loss or runny nose  Eyes: Negative for vision changes or scleral icterus  Respiratory: Negative for cough or shortness of breath  Cardiovascular: Negative for chest pain or heart palpitations  Gastrointestinal: Negative for abdominal pain, nausea, vomiting, constipation, melena, or hematochezia  Genitourinary: Negative for hematuria or dysuria  Musculoskeletal: Negative for joint swelling or gait instability  Neurologic: Negative for tremors or seizures  Psychiatric: Negative for suicidal ideations or depression  All other systems reviewed and negative    Physical Exam:   Vitals:    05/30/25 1002    BP: 112/82   Pulse: 85   SpO2: 98%     Constitutional: Well-developed, well-nourished, no acute distress  Eyes: Conjunctiva normal, sclera nonicteric  ENMT: Hearing grossly normal, oral mucosa moist  Neck: Supple, no palpable mass, trachea midline  Respiratory: Breathing comfortably, normal inspiratory effort  Cardiovascular: Regular rate, no peripheral edema, no jugular venous distention  Breast:   Right: No visible abnormalities on inspection while seated, with arms raised, or hands on hips. No masses, skin changes, or nipple abnormalities. Prior mastopexy incision noted.  Around 6:00, there is a palpable area of tissue density.  Left: No visible abnormalities on inspection while seated, with arms raised, or hands on hips. No masses or skin changes. Left breast mastectomy incision healed. Nipple tattoo.   No clinical chest wall involvement.  Lymphatics (palpable nodes): No cervical, supraclavicular, or axillary lymphadenopathy  Skin: Warm, dry, no rash on visualized skin surfaces  Musculoskeletal: Symmetric strength, normal gait  Psychiatric: Alert and oriented ×3, normal affect         Caryn San PA-C    [facility] - General Surgery   [address], [address]  [place] [address]    [address]  [place] [address]    Office: [phone]  Fax: [phone]

## 2025-05-30 ENCOUNTER — OFFICE VISIT (OUTPATIENT)
Dept: SURGERY | Facility: CLINIC | Age: 60
End: 2025-05-30
Payer: OTHER GOVERNMENT

## 2025-05-30 VITALS
WEIGHT: 209.4 LBS | HEART RATE: 85 BPM | DIASTOLIC BLOOD PRESSURE: 82 MMHG | HEIGHT: 69 IN | OXYGEN SATURATION: 98 % | SYSTOLIC BLOOD PRESSURE: 112 MMHG | BODY MASS INDEX: 31.01 KG/M2

## 2025-05-30 DIAGNOSIS — N64.59 ABNORMAL BREAST EXAM: Primary | ICD-10-CM

## 2025-05-30 DIAGNOSIS — Z12.31 ENCOUNTER FOR SCREENING MAMMOGRAM FOR MALIGNANT NEOPLASM OF BREAST: ICD-10-CM

## 2025-05-30 DIAGNOSIS — Z85.3 HISTORY OF BREAST CANCER: ICD-10-CM

## 2025-06-02 DIAGNOSIS — D05.12 DUCTAL CARCINOMA IN SITU (DCIS) OF LEFT BREAST: ICD-10-CM

## 2025-06-02 RX ORDER — TAMOXIFEN CITRATE 20 MG/1
20 TABLET ORAL DAILY
Qty: 90 TABLET | Refills: 3 | Status: SHIPPED | OUTPATIENT
Start: 2025-06-02

## 2025-06-17 ENCOUNTER — TELEPHONE (OUTPATIENT)
Dept: SURGERY | Facility: CLINIC | Age: 60
End: 2025-06-17
Payer: OTHER GOVERNMENT

## 2025-06-17 NOTE — TELEPHONE ENCOUNTER
HUB OK TO READ/ RELAY    Attempted to reach patient to schedule a 1 yr follow up with Martin. Hub ok to send to office. Anyone in office can assist.

## 2025-06-18 ENCOUNTER — OFFICE VISIT (OUTPATIENT)
Age: 60
End: 2025-06-18
Payer: OTHER GOVERNMENT

## 2025-06-18 VITALS — WEIGHT: 211 LBS | OXYGEN SATURATION: 97 % | HEIGHT: 69 IN | BODY MASS INDEX: 31.25 KG/M2 | HEART RATE: 96 BPM

## 2025-06-18 DIAGNOSIS — M21.862 ACQUIRED POSTERIOR EQUINUS OF BOTH LOWER EXTREMITIES: ICD-10-CM

## 2025-06-18 DIAGNOSIS — M77.41 METATARSALGIA OF BOTH FEET: ICD-10-CM

## 2025-06-18 DIAGNOSIS — M77.42 METATARSALGIA OF BOTH FEET: ICD-10-CM

## 2025-06-18 DIAGNOSIS — M79.671 BILATERAL FOOT PAIN: Primary | ICD-10-CM

## 2025-06-18 DIAGNOSIS — M79.672 BILATERAL FOOT PAIN: Primary | ICD-10-CM

## 2025-06-18 DIAGNOSIS — M21.861 ACQUIRED POSTERIOR EQUINUS OF BOTH LOWER EXTREMITIES: ICD-10-CM

## 2025-06-18 NOTE — PATIENT INSTRUCTIONS
PowerStep: [place] [employer]  - Full length insoles        Where to find:    Order online:  - [employer]    Local purchase:  - [employer]    [id number]                                                 [place] [address]

## 2025-06-18 NOTE — PROGRESS NOTES
06/18/2025  Foot and Ankle Surgery - New Patient   Provider: Dr. Juan Harrington DPM  Location: [facility]    Subjective:  Kaylee Little is a 59 y.o. female.     Chief Complaint   Patient presents with    Left Foot - Pain, Initial Evaluation     Ball of foot pain/discomfort-present for 4-6 months, worsening over the past month    Right Foot - Pain, Initial Evaluation, Nail Problem     Ball of foot pain/discomfort-present for 4-6 months, worsening over the past month    Initial Evaluation     Ana Gonzáles,   3/21/25       History of Present Illness  The patient presents for evaluation of foot pain.    She reports intermittent discomfort in the metatarsal region of her feet, particularly when engaging in prolonged walking activities. This discomfort is described as a sensation akin to a bunched-up sock. She has always had a high arch and has experienced difficulty with certain types of footwear. Her occupation involves sedentary work at a desk. She expresses concern about her upcoming trip to [place] in October 2025 due to her foot condition. Additionally, she mentions experiencing severe cramps in her toes when they are flexed downwards, a symptom she attributes to her current medication, tamoxifen.    Supplemental Information  She has scoliosis and is doing exercises for that.    FAMILY HISTORY  Her mother has neuropathy and back issues with hardware in her back from a car accident. Her mother is also diabetic and has undergone chemotherapy.    MEDICATIONS  Tamoxifen       No Known Allergies    Past Medical History:   Diagnosis Date    AK (actinic keratosis)     Ankle sprain     Arthralgia     Arthritis     HANDS    Breast cancer, left 04/06/2022    DX. SURGERY    Corneal abrasion     HISTORY    Dry eyes     Dry mouth     Osteopenia October 2020    Osteoporosis     Rosacea     FACE    Seasonal allergies     Seborrheic keratosis        Past Surgical History:   Procedure Laterality Date    BREAST  BIOPSY Left 4/5/2022    BREAST RECONSTRUCTION Left 06/06/2022    Procedure: Left breast immediate reconstruction with implant and mesh, possible expander, right breast  Reduction and use of spy;  Surgeon: Marjorie Garcia MD;  Location: [facility];  Service: Plastics;  Laterality: Left;    BREAST RECONSTRUCTION Bilateral 06/28/2023    Procedure: BREAST RECONSTRUCTION REVISION, Bilateral breast scar revision.;  Surgeon: Marjorie Garcia MD;  Location: [facility];  Service: Plastics;  Laterality: Bilateral;    COLONOSCOPY  2015    COSMETIC SURGERY  Implant left breast/rt breat reduction    6/6/22    D & C HYSTEROSCOPY  2018    ENDOMETRIAL BIOPSY  2018    FAT GRAFTING Right 06/28/2023    Procedure: with right breast fat graft;  Surgeon: Marjorie Garcia MD;  Location: [facility];  Service: Plastics;  Laterality: Right;    MASTECTOMY W/ SENTINEL NODE BIOPSY Left 06/06/2022    Procedure: LEFT BREAST SKIN SPARING MASTECTOMY WITH LEFT AXILLARY SENTINEL NODE BIOPSY;  Surgeon: Eloise Mendoza MD;  Location: [facility];  Service: General;  Laterality: Left;    REDUCTION MAMMAPLASTY  06/06/2022    Right side to match reconstruction on the left 2/2 cancer       Family History   Problem Relation Age of Onset    Rheum arthritis Mother     Diabetes Mother     Hypertension Mother     Hearing loss Mother     Arthritis Mother     Cancer Father         Prostate    COPD Father     Arthritis Sister     Lymphoma Sister     Diabetes Sister     Miscarriages / Stillbirths Sister     Cancer Sister         Lymphoma    Diabetes Brother     Lung cancer Other         MEN ON DAD'S SIDE    Other Other         BRAIN TUMOR;MEN ON DAD'S SIDE    Malig Hyperthermia Neg Hx        Social History     Socioeconomic History    Marital status:      Spouse name: Donnie   Tobacco Use    Smoking status: Never     Passive exposure: Past    Smokeless tobacco: Never   Vaping Use    Vaping status: Never Used   Substance and  "Sexual Activity    Alcohol use: Yes     Alcohol/week: 2.0 standard drinks of alcohol     Types: 1 Glasses of wine, 1 Cans of beer per week     Comment: Occasionally, not every week    Drug use: Never    Sexual activity: Yes     Partners: Male     Birth control/protection: Post-menopausal     Comment: monogamous with         Current Outpatient Medications on File Prior to Visit   Medication Sig Dispense Refill    alendronate (FOSAMAX) 70 MG tablet TAKE 1 TABLET EVERY 7 DAYS 12 tablet 3    calcium carb-cholecalciferol 600-800 MG-UNIT tablet Take 1 tablet by mouth Daily.      Cholecalciferol (D3-1000) 25 MCG (1000 UT) capsule Take 1 capsule by mouth Daily.      loratadine (CLARITIN) 10 MG tablet Take 1 tablet by mouth Daily.      multivitamin with minerals tablet tablet Take 1 tablet by mouth Daily.      naproxen sodium (ALEVE) 220 MG tablet Take 2 tablets by mouth Daily. HOLDING FOR SURGERY      Omega-3 Fatty Acids (FISH OIL PO) Take 1 tablet by mouth Daily. HOLDING FOR SURGERY      tamoxifen (NOLVADEX) 20 MG chemo tablet TAKE 1 TABLET DAILY 90 tablet 3     No current facility-administered medications on file prior to visit.         Objective   Pulse 96   Ht 175.3 cm (69\")   Wt 95.7 kg (211 lb)   SpO2 97%   BMI 31.16 kg/m²     Foot/Ankle Exam    GENERAL  Appearance:  appears stated age  Orientation:  AAOx3  Affect:  appropriate    VASCULAR     Right Foot Vascularity   Normal vascular exam    Dorsalis pedis:  2+  Posterior tibial:  2+  Skin temperature:  warm  Edema grading:  None  CFT:  < 3 seconds  Pedal hair growth:  Present  Varicosities:  none     Left Foot Vascularity   Normal vascular exam    Dorsalis pedis:  2+  Posterior tibial:  2+  Skin temperature:  warm  Edema grading:  None  CFT:  < 3 seconds  Pedal hair growth:  Present  Varicosities:  none     NEUROLOGIC     Right Foot Neurologic   Light touch sensation: normal  Hot/Cold sensation: normal  Achilles reflex:  2+     Left Foot Neurologic   Light " touch sensation: normal  Hot/Cold sensation:  normal  Achilles reflex:  2+    MUSCULOSKELETAL     Right Foot Musculoskeletal   Ecchymosis:  none  Tenderness:  metatarsals tenderness    Arch:  Normal     Left Foot Musculoskeletal   Ecchymosis:  none  Tenderness:  metatarsals tenderness  Arch:  Normal    MUSCLE STRENGTH     Right Foot Muscle Strength   Normal strength    Foot dorsiflexion:  5  Foot plantar flexion:  5  Foot inversion:  5  Foot eversion:  5     Left Foot Muscle Strength   Normal strength    Foot dorsiflexion:  5  Foot plantar flexion:  5  Foot inversion:  5  Foot eversion:  5    DERMATOLOGIC      Right Foot Dermatologic   Skin  Right foot skin is intact.   Nails comment:  Nails 1-5     Left Foot Dermatologic   Skin  Left foot skin is intact.   Nails comment:  Nails 1-5    TESTS     Right Foot Tests   Anterior drawer: negative  Varus tilt: negative     Left Foot Tests   Anterior drawer: negative  Varus tilt: negative     Right foot additional comments: Moderate equinus contracture with knee extended and flexed to both lower extremities.  No gross deformity or instability involving the feet.  Range of motion muscle strength are appropriate.    Physical Exam  There is some guarding in the musculoskeletal system. The muscles in the back of the leg are tight.       Results  Imaging  Foot x-rays show no significant arthritis or deformity.       Assessment & Plan   Diagnoses and all orders for this visit:    1. Bilateral foot pain (Primary)  -     Cancel: XR Foot 3+ View Bilateral  -     XR Foot 3+ View Bilateral    2. Metatarsalgia of both feet    3. Acquired posterior equinus of both lower extremities       Assessment & Plan    Symptoms are indicative of metatarsalgia, characterized by discomfort or abnormal sensations in the anterior region of the foot. The pathophysiology of this condition was thoroughly explained, including the role of muscle imbalance and nerve impingement. She was reassured that her  symptoms do not necessitate an MRI at this juncture. She was advised to wear tennis shoes with arch support for the majority of her day until her next visit. Recommended brands include ASICS, Brasher, and New Balance. PowerStep inserts were recommended for additional support. She was instructed to avoid wearing Crocs, flip flops, sandals, or going barefoot, even at home. Stretching exercises for the posterior leg muscles were suggested, including using a towel or TheraBand to pull back the foot and stretching with the knee straight against a door jamb. Low-impact activities such as biking, elliptical training, swimming, water aerobics, yoga, and Pilates were recommended. A handout detailing these exercises was provided. If symptoms persist or worsen, surgical intervention may be considered.Reviewed proper basic stretching and manual therapy exercises along with appropriate shoes and activity.  Discussed proper use and/or avoidance of OTC anti-inflammatories.  Patient is to call with any additional issues or concerns.  Greater than 30 minutes was spent before, during, and after evaluation for patient care.    The encounter note is created with the use of AI technology.  I do apologize if there are typos and/or confusion within the note.  Please feel free to contact me or my office with any questions or concerns.    Follow-up  The patient will follow up in 6 weeks.           Patient or patient representative verbalized consent for the use of Ambient Listening during the visit with  PRETTY Harrington DPM for chart documentation. 6/18/2025  13:26 EDT    PRETTY Harrington DPM

## 2025-06-19 ENCOUNTER — PATIENT ROUNDING (BHMG ONLY) (OUTPATIENT)
Age: 60
End: 2025-06-19
Payer: OTHER GOVERNMENT

## 2025-06-30 ENCOUNTER — HOSPITAL ENCOUNTER (OUTPATIENT)
Dept: MAMMOGRAPHY | Facility: HOSPITAL | Age: 60
Discharge: HOME | End: 2025-06-30
Payer: OTHER GOVERNMENT

## 2025-06-30 ENCOUNTER — HOSPITAL ENCOUNTER (OUTPATIENT)
Dept: ULTRASOUND IMAGING | Facility: HOSPITAL | Age: 60
Discharge: HOME | End: 2025-06-30
Payer: OTHER GOVERNMENT

## 2025-06-30 DIAGNOSIS — Z85.3 HISTORY OF BREAST CANCER: ICD-10-CM

## 2025-06-30 DIAGNOSIS — N64.59 ABNORMAL BREAST EXAM: ICD-10-CM

## 2025-06-30 PROCEDURE — 77065 DX MAMMO INCL CAD UNI: CPT

## 2025-06-30 PROCEDURE — G0279 TOMOSYNTHESIS, MAMMO: HCPCS

## 2025-06-30 PROCEDURE — 76642 ULTRASOUND BREAST LIMITED: CPT

## 2025-07-02 ENCOUNTER — RESULTS FOLLOW-UP (OUTPATIENT)
Dept: SURGERY | Facility: CLINIC | Age: 60
End: 2025-07-02
Payer: OTHER GOVERNMENT

## 2025-07-02 ENCOUNTER — PREP FOR SURGERY (OUTPATIENT)
Dept: OTHER | Facility: HOSPITAL | Age: 60
End: 2025-07-02
Payer: OTHER GOVERNMENT

## 2025-07-02 DIAGNOSIS — Z12.31 ENCOUNTER FOR SCREENING MAMMOGRAM FOR MALIGNANT NEOPLASM OF BREAST: Primary | ICD-10-CM

## 2025-07-02 DIAGNOSIS — Z12.11 ENCOUNTER FOR SCREENING COLONOSCOPY: Primary | ICD-10-CM

## 2025-07-02 RX ORDER — SODIUM CHLORIDE 9 MG/ML
30 INJECTION, SOLUTION INTRAVENOUS CONTINUOUS PRN
OUTPATIENT
Start: 2025-07-02 | End: 2025-07-03

## 2025-07-02 NOTE — TELEPHONE ENCOUNTER
Called patient to discuss results of recent diagnostic mammogram and ultrasound.  There is no mammographic or sonographic abnormality seen. BIRADS 2. Plan for screening mammogram 3/2026, and follow-up with me June/2026.      Saundra San PA-C

## 2025-07-16 NOTE — PROGRESS NOTES
Subjective   Kaylee Little is a 60 y.o. female.  Referred by Dr. 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    3/14/2022-bilateral screening mammogram  Indeterminate left breast calcifications.  Left breast diagnostic mammogram recommended.  No suspicious abnormalities of the right breast.    3/25/2022-left breast diagnostic mammogram  Suspicious left breast microcalcifications.  Stereotactic biopsy recommended.    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  ER +100%  MA negative  Ki-67 10%    4/20/2022-bilateral breast MRI  Nonspecific enhancement is seen surrounding the biopsy cavity.  Linear branching enhancement in the middle third medial aspect of the left breast at 9:00.  No mammographic correlate appreciated.  If breast conservation desired an MRI guided biopsy recommended.  Linear borderline clumped enhancement that measures to the order of 3.5 cm in the middle third upper outer quadrant of the left breast 2 o'clock position.  MRI guided biopsy recommended if breast conservation desired.  No suspicious findings for malignancy in the right breast.    6/6/2021-left breast mastectomy  Pathology consistent with ductal carcinoma in situ intermediate to high-grade  DCIS measures 16 mm  ER +100%  MA negative  4 sentinel lymph nodes negative.    Interval history  Kaylee returns today for follow up.  She continues tamoxifen 20 mg daily.  She reports occasional arthralgia in her toes and intermittent hot flashes.  These are tolerable at this time. Patient denies nausea, vomiting, abdominal pain, cough, shortness of air, bone pain and weight remains stable.  Last mammogram 3/28/2025  Last DEXA 11/04/2024      The following portions of the patient's history were  reviewed and updated as appropriate: allergies, current medications, past family history, past medical history, past social history, past surgical history and problem list.    Past Medical History:   Diagnosis Date    AK (actinic keratosis)     Ankle sprain     Arthralgia     Arthritis     HANDS    Breast cancer, left 04/06/2022    DX. SURGERY    Corneal abrasion     HISTORY    Dry eyes     Dry mouth     Osteopenia October 2020    Osteoporosis     Rosacea     FACE    Seasonal allergies     Seborrheic keratosis         Past Surgical History:   Procedure Laterality Date    BREAST BIOPSY Left 4/5/2022    BREAST RECONSTRUCTION Left 06/06/2022    Procedure: Left breast immediate reconstruction with implant and mesh, possible expander, right breast  Reduction and use of spy;  Surgeon: Marjorie Garcia MD;  Location: [facility] OR;  Service: Plastics;  Laterality: Left;    BREAST RECONSTRUCTION Bilateral 06/28/2023    Procedure: BREAST RECONSTRUCTION REVISION, Bilateral breast scar revision.;  Surgeon: Marjorie Garcia MD;  Location: [facility] OR;  Service: Plastics;  Laterality: Bilateral;    COLONOSCOPY  2015    COSMETIC SURGERY  Implant left breast/rt breat reduction    6/6/22    D & C HYSTEROSCOPY  2018    ENDOMETRIAL BIOPSY  2018    FAT GRAFTING Right 06/28/2023    Procedure: with right breast fat graft;  Surgeon: Marjorie Garcia MD;  Location: [facility] OR;  Service: Plastics;  Laterality: Right;    MASTECTOMY W/ SENTINEL NODE BIOPSY Left 06/06/2022    Procedure: LEFT BREAST SKIN SPARING MASTECTOMY WITH LEFT AXILLARY SENTINEL NODE BIOPSY;  Surgeon: Eloise Mendoza MD;  Location: [facility];  Service: General;  Laterality: Left;    REDUCTION MAMMAPLASTY  06/06/2022    Right side to match reconstruction on the left 2/2 cancer        Family History   Problem Relation Age of Onset    Rheum arthritis Mother     Diabetes Mother     Hypertension Mother     Hearing loss Mother     Arthritis  Mother     Cancer Father         Prostate    COPD Father     Arthritis Sister     Lymphoma Sister     Diabetes Sister     Miscarriages / Stillbirths Sister     Cancer Sister         Lymphoma    Diabetes Brother     Lung cancer Other         MEN ON DAD'S SIDE    Other Other         BRAIN TUMOR;MEN ON DAD'S SIDE    Malig Hyperthermia Neg Hx         Social History     Socioeconomic History    Marital status:      Spouse name: Donnie   Tobacco Use    Smoking status: Never     Passive exposure: Past    Smokeless tobacco: Never   Vaping Use    Vaping status: Never Used   Substance and Sexual Activity    Alcohol use: Yes     Alcohol/week: 2.0 standard drinks of alcohol     Types: 1 Glasses of wine, 1 Cans of beer per week     Comment: Occasionally, not every week    Drug use: Never    Sexual activity: Yes     Partners: Male     Birth control/protection: Post-menopausal     Comment: monogamous with         OB History    No obstetric history on file.          No Known Allergies     Review of systems as mentioned HPI otherwise negative    Objective   not currently breastfeeding.     Physical Exam  Vitals reviewed.   Constitutional:       Appearance: Normal appearance.   HENT:      Head: Normocephalic and atraumatic.      Nose: Nose normal.   Eyes:      Conjunctiva/sclera: Conjunctivae normal.   Cardiovascular:      Rate and Rhythm: Normal rate.   Pulmonary:      Effort: Pulmonary effort is normal.   Musculoskeletal:         General: Normal range of motion.      Cervical back: Normal range of motion.   Skin:     General: Skin is warm.   Neurological:      General: No focal deficit present.      Mental Status: She is alert and oriented to person, place, and time.   Psychiatric:         Mood and Affect: Mood normal.         Behavior: Behavior normal.         Thought Content: Thought content normal.         Judgment: Judgment normal.       Breast Exam: Status post left breast mastectomy with reconstruction and fat  grafting, left nipple tattooed.  Right breast changes consistent with mastopexy.  Tattooing of the scar of the right breast performed.  At the 6 o'clock position along the scar line there is nodular lesions which is consistent with scar tissue.    I have reexamined the patient and the results are consistent with the previously documented exam. Penny Ny, RADHA      No visits with results within 30 Day(s) from this visit.   Latest known visit with results is:   Orders Only on 03/27/2025   Component Date Value Ref Range Status    NCCN 03/27/2025 NCCN not met   Final    High Risk Cancer Risk Assessment        Mammo Diagnostic Digital Tomosynthesis Right With CAD  Result Date: 6/30/2025  1.     No mammographic or sonographic abnormality seen at the clinician palpated area of concern in the right breast at 6:00 along the mastopexy scar. Recommend management based on findings at clinical examination. 2.     No mammographic signs of malignancy in the right breast. Recommend annual screening mammography. I discussed results with the patient following the exam.  BI-RADS ASSESSMENT: Category 2: Benign   The patient's information is entered into a computerized reminder system with a targeted due date for the next mammogram.  Note:  It has been reported that there is approximately a 15% false negative in mammography.  Therefore, management of a palpable abnormality should not be deferred because of a negative mammogram.            6/30/2025 11:21 AM by Katiuska Pompa MD on Workstation: [id number]      US Breast Right Limited  Result Date: 6/30/2025  1.     No mammographic or sonographic abnormality seen at the clinician palpated area of concern in the right breast at 6:00 along the mastopexy scar. Recommend management based on findings at clinical examination. 2.     No mammographic signs of malignancy in the right breast. Recommend annual screening mammography. I discussed results with the patient following the exam.   BI-RADS ASSESSMENT: Category 2: Benign   The patient's information is entered into a computerized reminder system with a targeted due date for the next mammogram.  Note:  It has been reported that there is approximately a 15% false negative in mammography.  Therefore, management of a palpable abnormality should not be deferred because of a negative mammogram.            6/30/2025 11:21 AM by Katiuska Pompa MD on Workstation: [id number]      XR Foot 3+ View Bilateral  Result Date: 6/25/2025  Impression: RIGHT FOOT: No acute osseous abnormality. LEFT FOOT: No acute osseous abnormality. Electronically Signed: Tyler Garland MD  6/25/2025 10:52 AM EDT  Workstation ID: [id number]         Assessment & Plan     *Left breast ductal carcinoma in situ  ER positive, DC negative  Intermediate to high-grade  Measures 16 mm  Close margins of 1 mm.  Her case was discussed by Dr. Adrianna Aaron and her peer conference and radiation was not recommended.  Given that she is less than 60 there is a benefit of anastrozole or tamoxifen however she does have significant osteopenia with a T score of -2.3.  She was started on tamoxifen due to osteopenia.  Started tamoxifen July 2022.  January 2025: We discussed that the bone density has improved and there is an opportunity to switch to anastrozole if she would like to however after discussing the pros and cons we decided that she will continue tamoxifen.  Again discussed transitioning to anastrozole.  Ultimately patient decided to stay on tamoxifen.    *Osteoporosis  DEXA scan from November 2022 reviewed and shows osteoporosis with a T score of -2.8  Continue Fosamax  Tolerating Fosamax well.  If bone density improves we can consider transitioning to anastrozole  Repeat DEXA in November 2024 shows improvement in bone density with now osteopenia, continue Fosamax and tamoxifen.  DEXA due November 2026    *Close margins  Dr. Gore presented case in the peer conference and decided not to  proceed with adjuvant radiation.    *Hot flashes  Secondary to tamoxifen  Mild and tolerable  Continue to monitor      *Lower extremity cramps  Secondary to tamoxifen  Continue to monitor    PLAN:   Continue tamoxifen 20 mg daily.  Milligram due March 2026  DEXA due November 2026  Discussed possible switch to anastrozole however we will hold off at this time as she has tolerated tamoxifen overall well and also bone density still in the osteopenia range with a T-score of -2.2.  6 months MD    The patient is on high risk medication that requires close monitoring for toxicity.

## 2025-07-18 ENCOUNTER — OFFICE VISIT (OUTPATIENT)
Dept: ONCOLOGY | Facility: CLINIC | Age: 60
End: 2025-07-18
Payer: OTHER GOVERNMENT

## 2025-07-18 VITALS
SYSTOLIC BLOOD PRESSURE: 125 MMHG | TEMPERATURE: 97.9 F | HEART RATE: 89 BPM | OXYGEN SATURATION: 98 % | DIASTOLIC BLOOD PRESSURE: 89 MMHG | WEIGHT: 215.5 LBS | BODY MASS INDEX: 31.92 KG/M2 | HEIGHT: 69 IN

## 2025-07-18 DIAGNOSIS — Z79.810 LONG-TERM CURRENT USE OF TAMOXIFEN: ICD-10-CM

## 2025-07-18 DIAGNOSIS — D05.12 DUCTAL CARCINOMA IN SITU (DCIS) OF LEFT BREAST: Primary | ICD-10-CM

## 2025-08-18 ENCOUNTER — OFFICE VISIT (OUTPATIENT)
Age: 60
End: 2025-08-18
Payer: OTHER GOVERNMENT

## 2025-08-18 VITALS — BODY MASS INDEX: 31.84 KG/M2 | RESPIRATION RATE: 20 BRPM | HEIGHT: 69 IN | WEIGHT: 215 LBS

## 2025-08-18 DIAGNOSIS — M21.861 ACQUIRED POSTERIOR EQUINUS OF BOTH LOWER EXTREMITIES: ICD-10-CM

## 2025-08-18 DIAGNOSIS — M79.671 BILATERAL FOOT PAIN: Primary | ICD-10-CM

## 2025-08-18 DIAGNOSIS — M79.672 BILATERAL FOOT PAIN: Primary | ICD-10-CM

## 2025-08-18 DIAGNOSIS — M77.41 METATARSALGIA OF BOTH FEET: ICD-10-CM

## 2025-08-18 DIAGNOSIS — M77.42 METATARSALGIA OF BOTH FEET: ICD-10-CM

## 2025-08-18 DIAGNOSIS — M21.862 ACQUIRED POSTERIOR EQUINUS OF BOTH LOWER EXTREMITIES: ICD-10-CM

## 2025-08-18 PROCEDURE — 99213 OFFICE O/P EST LOW 20 MIN: CPT | Performed by: PODIATRIST

## (undated) DEVICE — TOTAL TRAY, 16FR 10ML SIL FOLEY, URN: Brand: MEDLINE

## (undated) DEVICE — GLV SURG BIOGEL LTX PF 5 1/2

## (undated) DEVICE — SYS CLS SKIN SURG PREMIERPRO EXOFINFUSION W/PTCH 30CM

## (undated) DEVICE — TRAP FLD MINIVAC MEGADYNE 100ML

## (undated) DEVICE — Device

## (undated) DEVICE — GLV SURG BIOGEL LTX PF 6 1/2

## (undated) DEVICE — INTENDED FOR TISSUE SEPARATION, AND OTHER PROCEDURES THAT REQUIRE A SHARP SURGICAL BLADE TO PUNCTURE OR CUT.: Brand: BARD-PARKER ® STAINLESS STEEL BLADES

## (undated) DEVICE — STCKNT IMPERV 9X36IN STRL

## (undated) DEVICE — STPLR SKIN SUBCUTICULAR INSORB 2030

## (undated) DEVICE — LEGGINGS, PAIR, 31X48, STERILE: Brand: MEDLINE

## (undated) DEVICE — EXTRCT STPL SKIN STRL BX/12

## (undated) DEVICE — SPNG LAP 18X18IN LF STRL PK/5

## (undated) DEVICE — LOU MINOR PROCEDURE: Brand: MEDLINE INDUSTRIES, INC.

## (undated) DEVICE — JACKSON-PRATT 100CC BULB RESERVOIR: Brand: CARDINAL HEALTH

## (undated) DEVICE — PATIENT RETURN ELECTRODE, SINGLE-USE, CONTACT QUALITY MONITORING, ADULT, WITH 9FT CORD, FOR PATIENTS WEIGING OVER 33LBS. (15KG): Brand: MEGADYNE

## (undated) DEVICE — PK UNIV COMPL 40

## (undated) DEVICE — KT FILL ASEPTIC/TRANSFR TISS/EXP STRL 1P/U

## (undated) DEVICE — ANTIBACTERIAL UNDYED BRAIDED (POLYGLACTIN 910), SYNTHETIC ABSORBABLE SUTURE: Brand: COATED VICRYL

## (undated) DEVICE — STPLR SKIN VISISTAT WD 35CT

## (undated) DEVICE — INTENDED FOR TISSUE SEPARATION, AND OTHER PROCEDURES THAT REQUIRE A SHARP SURGICAL BLADE TO PUNCTURE OR CUT.: Brand: BARD-PARKER ® CARBON RIB-BACK BLADES

## (undated) DEVICE — APPL CHLORAPREP HI/LITE 26ML ORNG

## (undated) DEVICE — GLV SURG SENSICARE PI MIC PF SZ5.5 LF STRL

## (undated) DEVICE — TBG PENCL TELESCP MEGADYNE SMOKE EVAC 10FT

## (undated) DEVICE — SUT PDS 2/0 SH 27IN Z317H

## (undated) DEVICE — SPNG GZ WOVN 4X4IN 12PLY 10/BX STRL

## (undated) DEVICE — SUT SILK 2/0 SH 30IN K833H

## (undated) DEVICE — PROXIMATE RH ROTATING HEAD SKIN STAPLERS (35 WIDE) CONTAINS 35 STAINLESS STEEL STAPLES: Brand: PROXIMATE

## (undated) DEVICE — CVR TRANSD CIV FLX TPR 11.9 TO 3.8X61CM

## (undated) DEVICE — SUT ETHLN 3/0 PS1 18IN 1663H